# Patient Record
Sex: MALE | Race: WHITE | NOT HISPANIC OR LATINO | Employment: OTHER | URBAN - METROPOLITAN AREA
[De-identification: names, ages, dates, MRNs, and addresses within clinical notes are randomized per-mention and may not be internally consistent; named-entity substitution may affect disease eponyms.]

---

## 2017-09-09 ENCOUNTER — APPOINTMENT (OUTPATIENT)
Dept: GENERAL RADIOLOGY | Facility: HOSPITAL | Age: 82
End: 2017-09-09

## 2017-09-09 ENCOUNTER — HOSPITAL ENCOUNTER (INPATIENT)
Facility: HOSPITAL | Age: 82
LOS: 5 days | End: 2017-09-14
Attending: EMERGENCY MEDICINE | Admitting: HOSPITALIST

## 2017-09-09 ENCOUNTER — APPOINTMENT (OUTPATIENT)
Dept: CT IMAGING | Facility: HOSPITAL | Age: 82
End: 2017-09-09

## 2017-09-09 DIAGNOSIS — T83.511A URINARY TRACT INFECTION ASSOCIATED WITH INDWELLING URETHRAL CATHETER, INITIAL ENCOUNTER (HCC): Primary | ICD-10-CM

## 2017-09-09 DIAGNOSIS — E87.5 HYPERKALEMIA: ICD-10-CM

## 2017-09-09 DIAGNOSIS — N17.9 ACUTE KIDNEY INJURY (HCC): ICD-10-CM

## 2017-09-09 DIAGNOSIS — F29 PSYCHOSIS, UNSPECIFIED PSYCHOSIS TYPE (HCC): ICD-10-CM

## 2017-09-09 DIAGNOSIS — N39.0 URINARY TRACT INFECTION ASSOCIATED WITH INDWELLING URETHRAL CATHETER, INITIAL ENCOUNTER (HCC): Primary | ICD-10-CM

## 2017-09-09 DIAGNOSIS — R53.1 GENERALIZED WEAKNESS: ICD-10-CM

## 2017-09-09 PROBLEM — J44.9 COPD (CHRONIC OBSTRUCTIVE PULMONARY DISEASE) (HCC): Status: ACTIVE | Noted: 2017-09-09

## 2017-09-09 PROBLEM — F03.90 DEMENTIA (HCC): Status: ACTIVE | Noted: 2017-09-09

## 2017-09-09 PROBLEM — R33.9 URINARY RETENTION: Status: ACTIVE | Noted: 2017-09-09

## 2017-09-09 PROBLEM — E07.9 DISEASE OF THYROID GLAND: Status: ACTIVE | Noted: 2017-09-09

## 2017-09-09 PROBLEM — J96.10 CHRONIC RESPIRATORY FAILURE (HCC): Status: ACTIVE | Noted: 2017-09-09

## 2017-09-09 PROBLEM — E11.9 DM II (DIABETES MELLITUS, TYPE II), CONTROLLED (HCC): Status: ACTIVE | Noted: 2017-09-09

## 2017-09-09 PROBLEM — E87.1 HYPONATREMIA: Status: ACTIVE | Noted: 2017-09-09

## 2017-09-09 PROBLEM — E87.6 HYPOKALEMIA: Status: ACTIVE | Noted: 2017-09-09

## 2017-09-09 LAB
ALBUMIN SERPL-MCNC: 3.4 G/DL (ref 3.5–5.2)
ALBUMIN/GLOB SERPL: 1 G/DL
ALP SERPL-CCNC: 51 U/L (ref 39–117)
ALT SERPL W P-5'-P-CCNC: 15 U/L (ref 1–41)
AMPHET+METHAMPHET UR QL: NEGATIVE
ANION GAP SERPL CALCULATED.3IONS-SCNC: 9.6 MMOL/L
AST SERPL-CCNC: 15 U/L (ref 1–40)
BACTERIA UR QL AUTO: ABNORMAL /HPF
BARBITURATES UR QL SCN: NEGATIVE
BASOPHILS # BLD AUTO: 0.01 10*3/MM3 (ref 0–0.2)
BASOPHILS NFR BLD AUTO: 0.1 % (ref 0–1.5)
BENZODIAZ UR QL SCN: NEGATIVE
BILIRUB SERPL-MCNC: 0.2 MG/DL (ref 0.1–1.2)
BILIRUB UR QL STRIP: NEGATIVE
BUN BLD-MCNC: 27 MG/DL (ref 8–23)
BUN/CREAT SERPL: 17.1 (ref 7–25)
CALCIUM SPEC-SCNC: 9 MG/DL (ref 8.6–10.5)
CANNABINOIDS SERPL QL: NEGATIVE
CHLORIDE SERPL-SCNC: 100 MMOL/L (ref 98–107)
CLARITY UR: ABNORMAL
CO2 SERPL-SCNC: 25.4 MMOL/L (ref 22–29)
COCAINE UR QL: NEGATIVE
COLOR UR: ABNORMAL
CREAT BLD-MCNC: 1.58 MG/DL (ref 0.76–1.27)
DEPRECATED RDW RBC AUTO: 42.6 FL (ref 37–54)
EOSINOPHIL # BLD AUTO: 0.03 10*3/MM3 (ref 0–0.7)
EOSINOPHIL NFR BLD AUTO: 0.4 % (ref 0.3–6.2)
ERYTHROCYTE [DISTWIDTH] IN BLOOD BY AUTOMATED COUNT: 13.1 % (ref 11.5–14.5)
GFR SERPL CREATININE-BSD FRML MDRD: 42 ML/MIN/1.73
GLOBULIN UR ELPH-MCNC: 3.5 GM/DL
GLUCOSE BLD-MCNC: 109 MG/DL (ref 65–99)
GLUCOSE BLDC GLUCOMTR-MCNC: 103 MG/DL (ref 70–130)
GLUCOSE BLDC GLUCOMTR-MCNC: 139 MG/DL (ref 70–130)
GLUCOSE UR STRIP-MCNC: NEGATIVE MG/DL
HCT VFR BLD AUTO: 35.8 % (ref 40.4–52.2)
HGB BLD-MCNC: 11.9 G/DL (ref 13.7–17.6)
HGB UR QL STRIP.AUTO: NEGATIVE
HYALINE CASTS UR QL AUTO: ABNORMAL /LPF
IMM GRANULOCYTES # BLD: 0.05 10*3/MM3 (ref 0–0.03)
IMM GRANULOCYTES NFR BLD: 0.6 % (ref 0–0.5)
KETONES UR QL STRIP: NEGATIVE
LEUKOCYTE ESTERASE UR QL STRIP.AUTO: ABNORMAL
LYMPHOCYTES # BLD AUTO: 1 10*3/MM3 (ref 0.9–4.8)
LYMPHOCYTES NFR BLD AUTO: 12 % (ref 19.6–45.3)
MCH RBC QN AUTO: 29.6 PG (ref 27–32.7)
MCHC RBC AUTO-ENTMCNC: 33.2 G/DL (ref 32.6–36.4)
MCV RBC AUTO: 89.1 FL (ref 79.8–96.2)
METHADONE UR QL SCN: NEGATIVE
MONOCYTES # BLD AUTO: 0.56 10*3/MM3 (ref 0.2–1.2)
MONOCYTES NFR BLD AUTO: 6.7 % (ref 5–12)
NEUTROPHILS # BLD AUTO: 6.67 10*3/MM3 (ref 1.9–8.1)
NEUTROPHILS NFR BLD AUTO: 80.2 % (ref 42.7–76)
NITRITE UR QL STRIP: POSITIVE
NT-PROBNP SERPL-MCNC: 297.3 PG/ML (ref 0–1800)
OPIATES UR QL: NEGATIVE
OXYCODONE UR QL SCN: NEGATIVE
PH UR STRIP.AUTO: 5.5 [PH] (ref 5–8)
PLATELET # BLD AUTO: 81 10*3/MM3 (ref 140–500)
PMV BLD AUTO: 9.9 FL (ref 6–12)
POTASSIUM BLD-SCNC: 5.8 MMOL/L (ref 3.5–5.2)
PROT SERPL-MCNC: 6.9 G/DL (ref 6–8.5)
PROT UR QL STRIP: NEGATIVE
RBC # BLD AUTO: 4.02 10*6/MM3 (ref 4.6–6)
RBC # UR: ABNORMAL /HPF
REF LAB TEST METHOD: ABNORMAL
SODIUM BLD-SCNC: 135 MMOL/L (ref 136–145)
SP GR UR STRIP: 1.01 (ref 1–1.03)
SQUAMOUS #/AREA URNS HPF: ABNORMAL /HPF
TROPONIN T SERPL-MCNC: <0.01 NG/ML (ref 0–0.03)
UROBILINOGEN UR QL STRIP: ABNORMAL
WBC NRBC COR # BLD: 8.32 10*3/MM3 (ref 4.5–10.7)
WBC UR QL AUTO: ABNORMAL /HPF

## 2017-09-09 PROCEDURE — 71020 HC CHEST PA AND LATERAL: CPT

## 2017-09-09 PROCEDURE — 25010000002 CEFTRIAXONE PER 250 MG: Performed by: EMERGENCY MEDICINE

## 2017-09-09 PROCEDURE — 93005 ELECTROCARDIOGRAM TRACING: CPT | Performed by: EMERGENCY MEDICINE

## 2017-09-09 PROCEDURE — 80307 DRUG TEST PRSMV CHEM ANLYZR: CPT | Performed by: HOSPITALIST

## 2017-09-09 PROCEDURE — 51702 INSERT TEMP BLADDER CATH: CPT

## 2017-09-09 PROCEDURE — 70450 CT HEAD/BRAIN W/O DYE: CPT

## 2017-09-09 PROCEDURE — 94640 AIRWAY INHALATION TREATMENT: CPT

## 2017-09-09 PROCEDURE — 83880 ASSAY OF NATRIURETIC PEPTIDE: CPT | Performed by: EMERGENCY MEDICINE

## 2017-09-09 PROCEDURE — 93010 ELECTROCARDIOGRAM REPORT: CPT | Performed by: INTERNAL MEDICINE

## 2017-09-09 PROCEDURE — 81001 URINALYSIS AUTO W/SCOPE: CPT | Performed by: EMERGENCY MEDICINE

## 2017-09-09 PROCEDURE — 82962 GLUCOSE BLOOD TEST: CPT

## 2017-09-09 PROCEDURE — 25010000002 VANCOMYCIN: Performed by: HOSPITALIST

## 2017-09-09 PROCEDURE — 85025 COMPLETE CBC W/AUTO DIFF WBC: CPT | Performed by: EMERGENCY MEDICINE

## 2017-09-09 PROCEDURE — 25010000002 ENOXAPARIN PER 10 MG: Performed by: HOSPITALIST

## 2017-09-09 PROCEDURE — 84484 ASSAY OF TROPONIN QUANT: CPT | Performed by: EMERGENCY MEDICINE

## 2017-09-09 PROCEDURE — 87081 CULTURE SCREEN ONLY: CPT | Performed by: HOSPITALIST

## 2017-09-09 PROCEDURE — 99285 EMERGENCY DEPT VISIT HI MDM: CPT

## 2017-09-09 PROCEDURE — 94799 UNLISTED PULMONARY SVC/PX: CPT

## 2017-09-09 PROCEDURE — 87086 URINE CULTURE/COLONY COUNT: CPT | Performed by: EMERGENCY MEDICINE

## 2017-09-09 PROCEDURE — 80053 COMPREHEN METABOLIC PANEL: CPT | Performed by: EMERGENCY MEDICINE

## 2017-09-09 RX ORDER — LORAZEPAM 0.5 MG/1
0.5 TABLET ORAL DAILY
COMMUNITY
End: 2017-09-14 | Stop reason: HOSPADM

## 2017-09-09 RX ORDER — SODIUM CHLORIDE 0.9 % (FLUSH) 0.9 %
1-10 SYRINGE (ML) INJECTION AS NEEDED
Status: DISCONTINUED | OUTPATIENT
Start: 2017-09-09 | End: 2017-09-14 | Stop reason: HOSPADM

## 2017-09-09 RX ORDER — PANTOPRAZOLE SODIUM 40 MG/1
40 TABLET, DELAYED RELEASE ORAL DAILY
COMMUNITY

## 2017-09-09 RX ORDER — DEXTROSE MONOHYDRATE 25 G/50ML
25 INJECTION, SOLUTION INTRAVENOUS
Status: DISCONTINUED | OUTPATIENT
Start: 2017-09-09 | End: 2017-09-14 | Stop reason: HOSPADM

## 2017-09-09 RX ORDER — NICOTINE POLACRILEX 4 MG
15 LOZENGE BUCCAL
Status: DISCONTINUED | OUTPATIENT
Start: 2017-09-09 | End: 2017-09-14 | Stop reason: HOSPADM

## 2017-09-09 RX ORDER — HYDROXYZINE HYDROCHLORIDE 25 MG/1
25 TABLET, FILM COATED ORAL 2 TIMES DAILY
Status: DISCONTINUED | OUTPATIENT
Start: 2017-09-09 | End: 2017-09-14 | Stop reason: HOSPADM

## 2017-09-09 RX ORDER — LEVOTHYROXINE SODIUM 0.03 MG/1
25 TABLET ORAL DAILY
COMMUNITY

## 2017-09-09 RX ORDER — HYDROXYZINE HYDROCHLORIDE 25 MG/1
25 TABLET, FILM COATED ORAL 2 TIMES DAILY
COMMUNITY

## 2017-09-09 RX ORDER — ONDANSETRON 4 MG/1
4 TABLET, FILM COATED ORAL EVERY 6 HOURS PRN
Status: DISCONTINUED | OUTPATIENT
Start: 2017-09-09 | End: 2017-09-14 | Stop reason: HOSPADM

## 2017-09-09 RX ORDER — BUDESONIDE AND FORMOTEROL FUMARATE DIHYDRATE 80; 4.5 UG/1; UG/1
2 AEROSOL RESPIRATORY (INHALATION)
Status: DISCONTINUED | OUTPATIENT
Start: 2017-09-09 | End: 2017-09-14 | Stop reason: HOSPADM

## 2017-09-09 RX ORDER — MEMANTINE HYDROCHLORIDE 10 MG/1
10 TABLET ORAL DAILY
COMMUNITY
End: 2017-09-21 | Stop reason: HOSPADM

## 2017-09-09 RX ORDER — HALOPERIDOL 1 MG/1
1 TABLET ORAL 2 TIMES DAILY
COMMUNITY
End: 2017-09-14 | Stop reason: HOSPADM

## 2017-09-09 RX ORDER — GLIPIZIDE 5 MG/1
5 TABLET ORAL DAILY
COMMUNITY
End: 2017-09-14 | Stop reason: HOSPADM

## 2017-09-09 RX ORDER — TAMSULOSIN HYDROCHLORIDE 0.4 MG/1
1 CAPSULE ORAL NIGHTLY
COMMUNITY

## 2017-09-09 RX ORDER — LEVOTHYROXINE SODIUM 0.03 MG/1
25 TABLET ORAL DAILY
Status: DISCONTINUED | OUTPATIENT
Start: 2017-09-09 | End: 2017-09-14 | Stop reason: HOSPADM

## 2017-09-09 RX ORDER — TAMSULOSIN HYDROCHLORIDE 0.4 MG/1
0.4 CAPSULE ORAL NIGHTLY
Status: DISCONTINUED | OUTPATIENT
Start: 2017-09-09 | End: 2017-09-14 | Stop reason: HOSPADM

## 2017-09-09 RX ORDER — MEMANTINE HYDROCHLORIDE 10 MG/1
10 TABLET ORAL DAILY
Status: DISCONTINUED | OUTPATIENT
Start: 2017-09-09 | End: 2017-09-14 | Stop reason: HOSPADM

## 2017-09-09 RX ORDER — GLIPIZIDE 5 MG/1
5 TABLET ORAL DAILY
Status: DISCONTINUED | OUTPATIENT
Start: 2017-09-09 | End: 2017-09-10

## 2017-09-09 RX ORDER — ONDANSETRON 2 MG/ML
4 INJECTION INTRAMUSCULAR; INTRAVENOUS EVERY 6 HOURS PRN
Status: DISCONTINUED | OUTPATIENT
Start: 2017-09-09 | End: 2017-09-14 | Stop reason: HOSPADM

## 2017-09-09 RX ORDER — ASPIRIN 325 MG
325 TABLET ORAL DAILY
COMMUNITY

## 2017-09-09 RX ORDER — RISPERIDONE 1 MG/1
1 TABLET ORAL 2 TIMES DAILY
Status: DISCONTINUED | OUTPATIENT
Start: 2017-09-09 | End: 2017-09-14 | Stop reason: HOSPADM

## 2017-09-09 RX ORDER — ACETAMINOPHEN 325 MG/1
650 TABLET ORAL EVERY 4 HOURS PRN
Status: DISCONTINUED | OUTPATIENT
Start: 2017-09-09 | End: 2017-09-14 | Stop reason: HOSPADM

## 2017-09-09 RX ORDER — MONTELUKAST SODIUM 10 MG/1
10 TABLET ORAL NIGHTLY
COMMUNITY

## 2017-09-09 RX ORDER — PANTOPRAZOLE SODIUM 40 MG/1
40 TABLET, DELAYED RELEASE ORAL DAILY
Status: DISCONTINUED | OUTPATIENT
Start: 2017-09-09 | End: 2017-09-14 | Stop reason: HOSPADM

## 2017-09-09 RX ORDER — SODIUM CHLORIDE 9 MG/ML
50 INJECTION, SOLUTION INTRAVENOUS CONTINUOUS
Status: DISCONTINUED | OUTPATIENT
Start: 2017-09-09 | End: 2017-09-11

## 2017-09-09 RX ORDER — OMEGA-3S/DHA/EPA/FISH OIL/D3 300MG-1000
400 CAPSULE ORAL DAILY
Status: DISCONTINUED | OUTPATIENT
Start: 2017-09-09 | End: 2017-09-14 | Stop reason: HOSPADM

## 2017-09-09 RX ORDER — RISPERIDONE 1 MG/1
1 TABLET ORAL 2 TIMES DAILY
COMMUNITY
End: 2017-09-21 | Stop reason: HOSPADM

## 2017-09-09 RX ORDER — SODIUM CHLORIDE 0.9 % (FLUSH) 0.9 %
10 SYRINGE (ML) INJECTION AS NEEDED
Status: DISCONTINUED | OUTPATIENT
Start: 2017-09-09 | End: 2017-09-14 | Stop reason: HOSPADM

## 2017-09-09 RX ORDER — ERGOCALCIFEROL (VITAMIN D2) 10 MCG
400 TABLET ORAL DAILY
COMMUNITY

## 2017-09-09 RX ORDER — ONDANSETRON 4 MG/1
4 TABLET, ORALLY DISINTEGRATING ORAL EVERY 6 HOURS PRN
Status: DISCONTINUED | OUTPATIENT
Start: 2017-09-09 | End: 2017-09-14 | Stop reason: HOSPADM

## 2017-09-09 RX ORDER — ASPIRIN 325 MG
325 TABLET ORAL DAILY
Status: DISCONTINUED | OUTPATIENT
Start: 2017-09-09 | End: 2017-09-14 | Stop reason: HOSPADM

## 2017-09-09 RX ORDER — MONTELUKAST SODIUM 10 MG/1
10 TABLET ORAL NIGHTLY
Status: DISCONTINUED | OUTPATIENT
Start: 2017-09-09 | End: 2017-09-14 | Stop reason: HOSPADM

## 2017-09-09 RX ORDER — CEFTRIAXONE SODIUM 1 G/50ML
1 INJECTION, SOLUTION INTRAVENOUS ONCE
Status: COMPLETED | OUTPATIENT
Start: 2017-09-09 | End: 2017-09-09

## 2017-09-09 RX ADMIN — ASPIRIN 325 MG: 325 TABLET ORAL at 18:31

## 2017-09-09 RX ADMIN — MEMANTINE HYDROCHLORIDE 10 MG: 10 TABLET, FILM COATED ORAL at 18:31

## 2017-09-09 RX ADMIN — HYDROXYZINE HYDROCHLORIDE 25 MG: 25 TABLET, FILM COATED ORAL at 18:31

## 2017-09-09 RX ADMIN — GLIPIZIDE 5 MG: 5 TABLET ORAL at 18:32

## 2017-09-09 RX ADMIN — ENOXAPARIN SODIUM 30 MG: 30 INJECTION SUBCUTANEOUS at 18:32

## 2017-09-09 RX ADMIN — BUDESONIDE AND FORMOTEROL FUMARATE DIHYDRATE 2 PUFF: 80; 4.5 AEROSOL RESPIRATORY (INHALATION) at 19:42

## 2017-09-09 RX ADMIN — SODIUM CHLORIDE 100 ML/HR: 9 INJECTION, SOLUTION INTRAVENOUS at 18:00

## 2017-09-09 RX ADMIN — SERTRALINE 50 MG: 50 TABLET, FILM COATED ORAL at 18:31

## 2017-09-09 RX ADMIN — LEVOTHYROXINE SODIUM 25 MCG: 25 TABLET ORAL at 18:31

## 2017-09-09 RX ADMIN — SODIUM CHLORIDE 1000 ML: 9 INJECTION, SOLUTION INTRAVENOUS at 10:08

## 2017-09-09 RX ADMIN — CEFTRIAXONE SODIUM 1 G: 1 INJECTION, SOLUTION INTRAVENOUS at 10:09

## 2017-09-09 RX ADMIN — CHOLECALCIFEROL TAB 10 MCG (400 UNIT) 400 UNITS: 10 TAB at 18:32

## 2017-09-09 RX ADMIN — PANTOPRAZOLE SODIUM 40 MG: 40 TABLET, DELAYED RELEASE ORAL at 18:31

## 2017-09-09 RX ADMIN — RISPERIDONE 1 MG: 1 TABLET, FILM COATED ORAL at 18:31

## 2017-09-09 RX ADMIN — MONTELUKAST 10 MG: 10 TABLET, FILM COATED ORAL at 20:07

## 2017-09-09 RX ADMIN — TAMSULOSIN HYDROCHLORIDE 0.4 MG: 0.4 CAPSULE ORAL at 20:07

## 2017-09-09 RX ADMIN — VANCOMYCIN HYDROCHLORIDE 1500 MG: 1 INJECTION, POWDER, LYOPHILIZED, FOR SOLUTION INTRAVENOUS at 20:07

## 2017-09-09 NOTE — PROGRESS NOTES
"Pharmacokinetic Consult - Vancomycin Dosing (Initial Note)    Juilano Garzon is a 87 y.o. male 67\" (170.2 cm) 152 lb 14.4 oz (69.4 kg)   Pharmacy consulted to dose vancomycin for UTI.  Pharmacy dosing vancomycin per Alla's request.   Goal trough: 10 - 20 mcg/mL   Additional Abx Therapy:    IV Anti-Infectives     Ordered     Dose/Rate Route Frequency Start Stop    09/09/17 1732  Pharmacy to dose vancomycin     Ordering Provider:  Reinier Gold MD     Does not apply Continuous PRN 09/09/17 1725      09/09/17 0943  cefTRIAXone (ROCEPHIN) IVPB 1 g     Ordering Provider:  Chandana Khan MD    1 g  over 30 Minutes Intravenous Once 09/09/17 0945 09/09/17 1039           Relevant clinical data and objective history reviewed:  -MRSA UTI 2 weeks ago treated at [facility]; presents to [facility] with AMS       Lab Results   Component Value Date    CREATININE 1.58 (H) 09/09/2017     Estimated Creatinine Clearance: 32.3 mL/min (by C-G formula based on Cr of 1.58).    Lab Results   Component Value Date    WBC 8.32 09/09/2017     Temp Readings from Last 1 Encounters:   09/09/17 98.1 °F (36.7 °C) (Oral)       Baseline culture/source/susceptibility:   9/9 MRSA Screen: Pending  9/9 Urine Cx: Pending      Assessment/Plan  Will start vancomycin 1500 mg IV x1 dose and intermittently dose due to the patient's poor renal function (RENZO), no available patient PMH of vancomycin dosing, and lower trough goal of 10 - 20. Vancomycin level due on 9/9 @ 0600 with AM labs.  Will monitor serum creatinine and vancomycin levels and adjust as needed. Recommend adequate hydration to prevent nephrotoxicity. Thank you Dr Gold for this consult.     Paulino Nava, PharmD  09/09/17 5:52 PM    "

## 2017-09-09 NOTE — ED TRIAGE NOTES
EMS called to truck stop by family.  Neighbor had found wandering in woods with blood in catheter unattached to bag.  Family pulled an old bag out of trash and attached to catheter.     Pt is from [place].  Family was bringing pt from [place] to [facility] but pulled into truck stop and called EMS.  Family stated they driving back home to get DNR and living will.

## 2017-09-09 NOTE — PLAN OF CARE
Problem: Patient Care Overview (Adult)  Goal: Plan of Care Review  Outcome: Ongoing (interventions implemented as appropriate)    09/09/17 1237   Coping/Psychosocial Response Interventions   Plan Of Care Reviewed With patient;daughter   Patient Care Overview   Progress no change   Outcome Evaluation   Outcome Summary/Follow up Plan Pt. admitted with confusion,psychosis and UTI. Pt. has moses catheter for urinery retention. Pt. with history of falls and scabs on lower ext. from falls. Pt. requires reorientation and frequent reminders . Family is concerned with pt. living alone and would prefer placement for rehab. Consults obtained. Will continue to monitor. 09/09/17         Problem: Fall Risk (Adult)  Goal: Identify Related Risk Factors and Signs and Symptoms  Outcome: Ongoing (interventions implemented as appropriate)  Goal: Absence of Falls  Outcome: Ongoing (interventions implemented as appropriate)    Problem: Infection, Risk/Actual (Adult)  Goal: Identify Related Risk Factors and Signs and Symptoms  Outcome: Ongoing (interventions implemented as appropriate)  Goal: Infection Prevention/Resolution  Outcome: Ongoing (interventions implemented as appropriate)    Problem: Confusion, Acute (Adult)  Goal: Identify Related Risk Factors and Signs and Symptoms  Outcome: Ongoing (interventions implemented as appropriate)  Goal: Cognitive/Functional Impairments Minimized  Outcome: Ongoing (interventions implemented as appropriate)  Goal: Safety  Outcome: Ongoing (interventions implemented as appropriate)

## 2017-09-09 NOTE — H&P
"HISTORY AND PHYSICAL   [facility]        Patient Identification:  Name: Juliano Garzon  Age: 87 y.o.  Sex: male  :  1929  MRN: [id number]                     Primary Care Physician: No Known Provider    Chief Complaint:  Confusion    History of Present Illness:   Patient is a poor historian and the history comes from discussion with the ER staff, available records and his son who is presently at bedside.  DR history of present illness action summarizes the acute issues specifically as follows:    \"Pt is a 87 y.o. male who presents complaining of confusion. [facility] for [facility] MRSA kidney infection. After pt was discharged, he stayed with his daughter for 2 days, before returning to his own home for the rest of the week. Last night, the pt was found wondering around his neighborhood around 0400. The pt had cut his catheter, so that it was no longer connected to its bag. The pt was found after he rang his neighbors doorbell claiming that he was looking for his daughter. Pt was not wearing his O2 while walking and his O2 stats were in the 70s when he was found. Per daughter, the pt has been having visual hallucinations for the last 3 years, but otherwise functions normally. Pt c/o back pain which has since resolved. Pt denies cp, SOA, nausea, and vomiting. Pt was put on vancomycin and bactrim for his kidney infection. Per EMS the pt's blood sugar levels were 129.\"  His son at bedside notes there is been issues with dementia for some time now.  He notes that for almost a year now the patient has been seeing people that are not there.  They have been urging him not to live alone and quite adamant that he lives in his own house by himself.  On questioning he notes that he's had a Gonsalez catheter in place for 6 months now.  Apparently he is also on home O2 is uncertain.  I'm presently also waiting for records from [place] in " [place]          Past Medical History:  Past Medical History:   Diagnosis Date   • COPD (chronic obstructive pulmonary disease)    • Dementia    • Diabetes mellitus    • Disease of thyroid gland    • GERD (gastroesophageal reflux disease)      Past Surgical History:  History reviewed. No pertinent surgical history.   Home Meds:  Prescriptions Prior to Admission   Medication Sig Dispense Refill Last Dose   • aspirin 325 MG tablet Take 325 mg by mouth Daily.      • Fluticasone Furoate-Vilanterol (BREO ELLIPTA) 100-25 MCG/INH aerosol powder  Inhale Daily.      • glipiZIDE (GLUCOTROL) 5 MG tablet Take 5 mg by mouth Daily.      • haloperidol (HALDOL) 1 MG tablet Take 1 mg by mouth 2 (Two) Times a Day.      • hydrOXYzine (ATARAX) 25 MG tablet Take 25 mg by mouth 2 (Two) Times a Day.      • levothyroxine (SYNTHROID, LEVOTHROID) 25 MCG tablet Take 25 mcg by mouth Daily.      • LORazepam (ATIVAN) 0.5 MG tablet Take 0.5 mg by mouth Daily.      • memantine (NAMENDA) 10 MG tablet Take 10 mg by mouth Daily.      • metFORMIN (GLUCOPHAGE) 500 MG tablet Take 500 mg by mouth 2 (Two) Times a Day With Meals.      • montelukast (SINGULAIR) 10 MG tablet Take 10 mg by mouth Every Night.      • pantoprazole (PROTONIX) 40 MG EC tablet Take 40 mg by mouth Daily.      • risperiDONE (risperDAL) 1 MG tablet Take 1 mg by mouth 2 (Two) Times a Day.      • sertraline (ZOLOFT) 50 MG tablet Take 50 mg by mouth Daily.      • tamsulosin (FLOMAX) 0.4 MG capsule 24 hr capsule Take 1 capsule by mouth Every Night.      • Vitamin D, Cholecalciferol, (CHOLECALCIFEROL) 400 units tablet Take 400 Units by mouth Daily.          Allergies:  No Known Allergies  Immunizations:    There is no immunization history on file for this patient.  Social History:   Social History     Social History Narrative   • No narrative on file     Social History   Substance Use Topics   • Smoking status: Former Smoker     Types: Cigarettes   • Smokeless tobacco: Not on file   •  Alcohol use No     Family History:  History reviewed. No pertinent family history.     Review of Systems  Review of Systems   Unable to perform ROS: Dementia       Objective:  tMax 24 hrs: Temp (24hrs), Av.9 °F (36.6 °C), Min:97.6 °F (36.4 °C), Max:98.3 °F (36.8 °C)    Vitals Ranges:   Temp:  [97.6 °F (36.4 °C)-98.3 °F (36.8 °C)] 98.1 °F (36.7 °C)  Heart Rate:  [63-79] 66  Resp:  [15-18] 16  BP: (127-154)/(54-92) 127/64      Exam:  Physical Exam   Constitutional: He appears well-developed and well-nourished. No distress.   Moderately thin.   HENT:   Head: Normocephalic and atraumatic.   Right Ear: External ear normal.   Left Ear: External ear normal.   Nose: Nose normal.   Mouth/Throat: Oropharynx is clear and moist.   Eyes: Conjunctivae and EOM are normal. Left eye exhibits no discharge. No scleral icterus.   Neck: Neck supple. No tracheal deviation present. No thyromegaly present.   Cardiovascular: Normal rate and regular rhythm.  Exam reveals no gallop and no friction rub.    Murmur (2/6 systolic) heard.  Pedal pulses right lower extremity slight.  Left lower extremity 1+.  Upper extremity shy 2+ bilaterally.   Pulmonary/Chest: Effort normal and breath sounds normal. No stridor. No respiratory distress. He exhibits no tenderness.   Abdominal: Soft. Bowel sounds are normal. He exhibits no distension and no mass. There is no tenderness. There is no rebound and no guarding.   Musculoskeletal: He exhibits no edema.   Neurological: He is alert.   Oriented to person.  After some time he did figure out that he was in a hospital.  Conversant and moderately cooperative.  His answers to questions are frequently not related to the question that was asked.   Skin: Skin is warm and dry. He is not diaphoretic.   Psychiatric:   Please see neurologic exam.       Data Review:  All labs and radiology reviewed.    Assessment:  Principal Problem:    RENZO (acute kidney injury):  Presently creatinine reportedly significantly above  his baseline.  Awaiting labs from [facility].  We'll hydrate the patient overnight and monitor closely.    Active Problems:      Urinary tract infection associated with indwelling urethral catheter:  Recent history of MRSA UTI.  We'll go ahead and cover with vancomycin pending culture results.  I will also recheck a urinalysis as I'm not entirely sure of the origin of the first sample.      Psychosis:  We'll start on routine Seroquel.  I have requested family member stay with patient for the night if possible.      Dementia:  Probably Alzheimer's in origin.  We'll check RPR, B12 etc. and get a neurology opinion.      Disease of thyroid gland      COPD (chronic obstructive pulmonary disease)      Chronic respiratory failure      Hypokalemia:  In combination with the hyponatremia I will check a morning cortisol level.      Hyponatremia:       Urinary retention:   We'll add Flomax to his regime and get a urology opinion      DM II (diabetes mellitus, type II), controlled:  Provide sliding-scale insulin.    Pt has document on record stating DNR.       Plan:  Please see above.    Reinier Gold MD  9/9/2017  5:01 PM    EMR Dragon/Transcription disclaimer:   Much of this encounter note is an electronic transcription/translation of spoken language to printed text. The electronic translation of spoken language may permit erroneous, or at times, nonsensical words or phrases to be inadvertently transcribed; Although I have reviewed the note for such errors, some may still exist.

## 2017-09-09 NOTE — ED PROVIDER NOTES
EMERGENCY DEPARTMENT ENCOUNTER    CHIEF COMPLAINT  Chief Complaint: confusion  History given by: pt's daughter and son  History limited by: nothing  Room Number: 16/16  PMD: No Known Provider  Wally Valdez    HPI:  Pt is a 87 y.o. male who presents complaining of confusion. Pt was admitted to [facility] for a week, two weeks ago for a MRSA kidney infection. After pt was discharged, he stayed with his daughter for 2 days, before returning to his own home for the rest of the week. Last night, the pt was found wondering around his neighborhood around 0400. The pt had cut his catheter, so that it was no longer connected to its bag. The pt was found after he rang his neighbors doorbell claiming that he was looking for his daughter. Pt was not wearing his O2 while walking and his O2 stats were in the 70s when he was found. Per daughter, the pt has been having visual hallucinations for the last 3 years, but otherwise functions normally. Pt c/o back pain which has since resolved. Pt denies cp, SOA, nausea, and vomiting. Pt was put on vancomycin and bactrim for his kidney infection. Per EMS the pt's blood sugar levels were 129.    Duration:  Since earlier this morning  Onset: gradual  Timing: constant  Radiation: none  Quality: confusion  Intensity/Severity: moderate  Progression: resolved  Associated Symptoms: back pain  Aggravating Factors: none  Alleviating Factors: none  Previous Episodes: Pt has had visual hallucinations for the past 3 years.  Treatment before arrival: Pt was put on vancomycin and bactrim for his kidney infection.    PAST MEDICAL HISTORY  Active Ambulatory Problems     Diagnosis Date Noted   • No Active Ambulatory Problems     Resolved Ambulatory Problems     Diagnosis Date Noted   • No Resolved Ambulatory Problems     Past Medical History:   Diagnosis Date   • COPD (chronic obstructive pulmonary disease)    • Dementia    • Diabetes mellitus    • Disease of thyroid gland    • GERD  (gastroesophageal reflux disease)        PAST SURGICAL HISTORY  History reviewed. No pertinent surgical history.    FAMILY HISTORY  History reviewed. No pertinent family history.    SOCIAL HISTORY  Social History     Social History   • Marital status: Single     Spouse name: N/A   • Number of children: N/A   • Years of education: N/A     Occupational History   • Not on file.     Social History Main Topics   • Smoking status: Former Smoker     Types: Cigarettes   • Smokeless tobacco: Not on file   • Alcohol use No   • Drug use: No   • Sexual activity: Not on file     Other Topics Concern   • Not on file     Social History Narrative   • No narrative on file       ALLERGIES  Review of patient's allergies indicates no known allergies.    REVIEW OF SYSTEMS  Review of Systems   Constitutional: Negative for activity change, appetite change and fever.   HENT: Negative for congestion and sore throat.    Eyes: Negative.    Respiratory: Negative for cough and shortness of breath.    Cardiovascular: Negative for chest pain and leg swelling.   Gastrointestinal: Negative for abdominal pain, diarrhea and vomiting.   Endocrine: Negative.    Genitourinary: Negative for decreased urine volume and dysuria.   Musculoskeletal: Positive for back pain (since resolved). Negative for neck pain.   Skin: Negative for rash and wound.   Allergic/Immunologic: Negative.    Neurological: Negative for weakness, numbness and headaches.   Hematological: Negative.    Psychiatric/Behavioral: Positive for confusion.   All other systems reviewed and are negative.      PHYSICAL EXAM  ED Triage Vitals   Temp Heart Rate Resp BP SpO2   09/09/17 0642 09/09/17 0652 09/09/17 0652 09/09/17 0652 09/09/17 0652   97.6 °F (36.4 °C) 79 18 136/87 100 %      Temp src Heart Rate Source Patient Position BP Location FiO2 (%)   09/09/17 0642 09/09/17 0652 -- -- --   Tympanic Monitor          Physical Exam   Constitutional: He is well-developed, well-nourished, and in no  distress.   HENT:   Head: Normocephalic and atraumatic.   Eyes: EOM are normal. Pupils are equal, round, and reactive to light.   Neck: Normal range of motion. Neck supple.   Cardiovascular: Normal rate, regular rhythm and normal heart sounds.    Pulmonary/Chest: Effort normal and breath sounds normal. No respiratory distress.   Abdominal: Soft. There is no tenderness. There is no rebound and no guarding.   Genitourinary:   Genitourinary Comments: Gonsalez catheter in place, draining dark urine   Musculoskeletal: Normal range of motion. He exhibits no edema.   No horacio tenderness   Neurological: He is alert. He has normal sensation and normal strength.   Oriented to person and place  No aphasia   No dysarthria   Skin: Skin is warm and dry.   Superficial abrasions to R knee, R shin, and left knee   Psychiatric: Mood and affect normal.   Intermittent visual hallucinations    Nursing note and vitals reviewed.      LAB RESULTS  Lab Results (last 24 hours)     Procedure Component Value Units Date/Time    CBC & Differential [[id number]] Collected:  09/09/17 0800    Specimen:  Blood Updated:  09/09/17 0821    Narrative:       The following orders were created for panel order CBC & Differential.  Procedure                               Abnormality         Status                     ---------                               -----------         ------                     CBC Auto Differential[[id number]]        Abnormal            Final result                 Please view results for these tests on the individual orders.    Comprehensive Metabolic Panel [[id number]]  (Abnormal) Collected:  09/09/17 0800    Specimen:  Blood Updated:  09/09/17 0838     Glucose 109 (H) mg/dL      BUN 27 (H) mg/dL      Creatinine 1.58 (H) mg/dL      Sodium 135 (L) mmol/L      Potassium 5.8 (H) mmol/L      Chloride 100 mmol/L      CO2 25.4 mmol/L      Calcium 9.0 mg/dL      Total Protein 6.9 g/dL      Albumin 3.40 (L) g/dL      ALT (SGPT) 15 U/L      AST  (SGOT) 15 U/L      Alkaline Phosphatase 51 U/L      Total Bilirubin 0.2 mg/dL      eGFR Non African Amer 42 (L) mL/min/1.73      Globulin 3.5 gm/dL      A/G Ratio 1.0 g/dL      BUN/Creatinine Ratio 17.1     Anion Gap 9.6 mmol/L     Narrative:       The MDRD GFR formula is only valid for adults with stable renal function between ages 18 and 70.    BNP [[id number]]  (Normal) Collected:  09/09/17 0800    Specimen:  Blood Updated:  09/09/17 0836     proBNP 297.3 pg/mL     Narrative:       Among patients with dyspnea, NT-proBNP is highly sensitive for the detection of acute congestive heart failure. In addition NT-proBNP of <300 pg/ml effectively rules out acute congestive heart failure with 99% negative predictive value.    Troponin [[id number]]  (Normal) Collected:  09/09/17 0800    Specimen:  Blood Updated:  09/09/17 0838     Troponin T <0.010 ng/mL     Narrative:       Troponin T Reference Ranges:  Less than 0.03 ng/mL:    Negative for AMI  0.03 to 0.09 ng/mL:      Indeterminant for AMI  Greater than 0.09 ng/mL: Positive for AMI    CBC Auto Differential [[id number]]  (Abnormal) Collected:  09/09/17 0800    Specimen:  Blood Updated:  09/09/17 0821     WBC 8.32 10*3/mm3      RBC 4.02 (L) 10*6/mm3      Hemoglobin 11.9 (L) g/dL      Hematocrit 35.8 (L) %      MCV 89.1 fL      MCH 29.6 pg      MCHC 33.2 g/dL      RDW 13.1 %      RDW-SD 42.6 fl      MPV 9.9 fL      Platelets 81 (L) 10*3/mm3      Neutrophil % 80.2 (H) %      Lymphocyte % 12.0 (L) %      Monocyte % 6.7 %      Eosinophil % 0.4 %      Basophil % 0.1 %      Immature Grans % 0.6 (H) %      Neutrophils, Absolute 6.67 10*3/mm3      Lymphocytes, Absolute 1.00 10*3/mm3      Monocytes, Absolute 0.56 10*3/mm3      Eosinophils, Absolute 0.03 10*3/mm3      Basophils, Absolute 0.01 10*3/mm3      Immature Grans, Absolute 0.05 (H) 10*3/mm3     Urinalysis With / Culture If Indicated [[id number]]  (Abnormal) Collected:  09/09/17 1795    Specimen:  Urine from Urine, Catheter  Updated:  09/09/17 0853     Color, UA Orange (A)      Any Substance that causes an abnormal urine color can alter the accuracy of the chemical reactions.        Appearance, UA Cloudy (A)     pH, UA 5.5     Specific Gravity, UA 1.009     Glucose, UA Negative     Ketones, UA Negative     Bilirubin, UA Negative     Blood, UA Negative     Protein, UA Negative     Leuk Esterase, UA Moderate (2+) (A)     Nitrite, UA Positive (A)     Urobilinogen, UA 1.0 E.U./dL    Urinalysis, Microscopic Only [[id number]]  (Abnormal) Collected:  09/09/17 0833    Specimen:  Urine from Urine, Catheter Updated:  09/09/17 0853     RBC, UA 0-2 /HPF      WBC, UA 3-5 (A) /HPF      Bacteria, UA None Seen /HPF      Squamous Epithelial Cells, UA 0-2 /HPF      Hyaline Casts, UA 3-6 /LPF      Methodology Automated Microscopy    Urine Culture [[id number]] Collected:  09/09/17 0833    Specimen:  Urine from Urine, Catheter Updated:  09/09/17 0846          I ordered the above labs and reviewed the results    RADIOLOGY  XR Chest 2 View   FINDINGS: There are no prior exams for comparison. The heart is top  normal in size with sternal wires from previous cardiac surgery. There  is some minimal elevation of the left hemidiaphragm associated with some  minimal pleural thickening along the left lateral chest wall and likely  related to chronic change. There is also some minimal interstitial  change in the lateral aspect of the right upper lobe related to either  chronic scarring or minimal developing infiltrate.      CT Head Without Contrast           Discussed CT head with Dr. Dangelo (radiology) which showed nothing acute.     I ordered the above noted radiological studies. Interpreted by radiologist. Discussed with radiologist (Dr. Dangelo). Reviewed by me in PACS.       PROCEDURES  Procedures  EKG           EKG time: 0847  Rhythm/Rate: NSR, rate 67  P waves and MN: normal  QRS, axis: RAD, RBBB, Q wave in lead 3   ST and T waves: inverted ST wave in lead  V1     Interpreted Contemporaneously by me, independently viewed  No piror for comparison      PROGRESS AND CONSULTS  ED Course   Value Comment By Time   Creatinine: (!) 1.58 1.1 on 8/31/17 Chandana Khan MD 09/09 0902   Hemoglobin: (!) 11.9 stable Chandana Khan MD 09/09 0926     0743: Ordered EKG, labs, XR chest, and CT head for further evaluation.   0914: Ordered IVF.  0920:Rechecked pt, he was resting comfortably.Discussed CT head and XR chest which showed nothing acute. Discussed lab results which showed increased creatinine levels and urine which showed indications of infection. Discussed plan to consult with [facility] and for pt to ultimately have an evaluation by psych. Pt's family agrees with and understands plan to admit pt. All questions were addressed at this time.  0927: Placed call to [facility].  0939: Discussed pt's case with Dr. Gold ([facility]) who agrees to admit pt to a med surg bed.  0943 :Ordered rocephin for UTI.    MEDICAL DECISION MAKING  Results were reviewed/discussed with the patient and they were also made aware of online access. Pt also made aware that some labs, such as cultures, will not be resulted during ER visit and follow up with PMD is necessary.     MDM  Number of Diagnoses or Management Options  Acute kidney injury:   Hyperkalemia:   Psychosis, unspecified psychosis type:   Urinary tract infection associated with indwelling urethral catheter, initial encounter:   Diagnosis management comments: Patient was recently admitted to [facility] for MRSA UTI.  He was discharged home on Bactrim.  He was initially staying with his daughter but 2 days ago he moved back into his own home where he lives alone.  Early this morning, patient walked to his neighbor's house looking for his daughter and was confused.  While in the ER, the patient was noted to have visual hallucinations.  He was disoriented to time but the remainder of his neurological exam was normal.  Head CT was negative.   Patient's creatinine was elevated from baseline.  His urine also appeared to be infected.  Patient was given IV fluids and IV Rocephin.  His potassium was also mildly elevated.  Case was discussed Dr. Ly and he agreed to admit the patient.  Patient will need a psych consult as an inpatient.       Amount and/or Complexity of Data Reviewed  Clinical lab tests: ordered and reviewed (Creatinine: 1.58)  Tests in the radiology section of CPT®: ordered and reviewed (CT head: showed nothing actue)  Tests in the medicine section of CPT®: ordered and reviewed (See note)  Discussion of test results with the performing providers: yes (Dr. Dangelo (radiology))  Decide to obtain previous medical records or to obtain history from someone other than the patient: yes  Obtain history from someone other than the patient: yes (Pt's daughter and son)  Review and summarize past medical records: yes (Admitted 8/26/17 for MRSA UTI and COPD exacerbation and acute chronic psychosis. Urine was sensitive to bactrim. )  Discuss the patient with other providers: yes (Dr. Gold ([facility]))  Independent visualization of images, tracings, or specimens: yes    Patient Progress  Patient progress: stable         DIAGNOSIS  Final diagnoses:   Urinary tract infection associated with indwelling urethral catheter, initial encounter   Acute kidney injury   Hyperkalemia   Psychosis, unspecified psychosis type       DISPOSITION  ADMISSION    Discussed treatment plan and reason for admission with pt/family and admitting physician.  Pt/family voiced understanding of the plan for admission for further testing/treatment as needed.           Latest Documented Vital Signs:  As of 9:49 AM  BP- 139/64 HR- 67 Temp- 97.6 °F (36.4 °C) (Tympanic) O2 sat- 99%    --  Documentation assistance provided by zoey Marcial for Dr Khan.  Information recorded by the zoey was done at my direction and has been verified and validated by me.              Joana  Aggie  09/09/17 0950       Chandana Khan MD  09/09/17 1257

## 2017-09-10 ENCOUNTER — APPOINTMENT (OUTPATIENT)
Dept: CT IMAGING | Facility: HOSPITAL | Age: 82
End: 2017-09-10

## 2017-09-10 LAB
ANION GAP SERPL CALCULATED.3IONS-SCNC: 10.4 MMOL/L
BACTERIA SPEC AEROBE CULT: NO GROWTH
BACTERIA UR QL AUTO: ABNORMAL /HPF
BILIRUB UR QL STRIP: NEGATIVE
BUN BLD-MCNC: 19 MG/DL (ref 8–23)
BUN/CREAT SERPL: 13.7 (ref 7–25)
CALCIUM SPEC-SCNC: 8.4 MG/DL (ref 8.6–10.5)
CHLORIDE SERPL-SCNC: 108 MMOL/L (ref 98–107)
CLARITY UR: CLEAR
CO2 SERPL-SCNC: 23.6 MMOL/L (ref 22–29)
COLOR UR: YELLOW
CORTIS SERPL-MCNC: 8.52 MCG/DL
CREAT BLD-MCNC: 1.39 MG/DL (ref 0.76–1.27)
DEPRECATED RDW RBC AUTO: 44.3 FL (ref 37–54)
ERYTHROCYTE [DISTWIDTH] IN BLOOD BY AUTOMATED COUNT: 13.4 % (ref 11.5–14.5)
GFR SERPL CREATININE-BSD FRML MDRD: 48 ML/MIN/1.73
GLUCOSE BLD-MCNC: 89 MG/DL (ref 65–99)
GLUCOSE BLDC GLUCOMTR-MCNC: 109 MG/DL (ref 70–130)
GLUCOSE BLDC GLUCOMTR-MCNC: 69 MG/DL (ref 70–130)
GLUCOSE BLDC GLUCOMTR-MCNC: 86 MG/DL (ref 70–130)
GLUCOSE BLDC GLUCOMTR-MCNC: 88 MG/DL (ref 70–130)
GLUCOSE UR STRIP-MCNC: NEGATIVE MG/DL
HBA1C MFR BLD: 5.22 % (ref 4.8–5.6)
HCT VFR BLD AUTO: 32.4 % (ref 40.4–52.2)
HGB BLD-MCNC: 10.5 G/DL (ref 13.7–17.6)
HGB UR QL STRIP.AUTO: ABNORMAL
HYALINE CASTS UR QL AUTO: ABNORMAL /LPF
KETONES UR QL STRIP: NEGATIVE
LEUKOCYTE ESTERASE UR QL STRIP.AUTO: NEGATIVE
MAGNESIUM SERPL-MCNC: 2.2 MG/DL (ref 1.6–2.4)
MCH RBC QN AUTO: 29.4 PG (ref 27–32.7)
MCHC RBC AUTO-ENTMCNC: 32.4 G/DL (ref 32.6–36.4)
MCV RBC AUTO: 90.8 FL (ref 79.8–96.2)
NITRITE UR QL STRIP: NEGATIVE
PH UR STRIP.AUTO: <=5 [PH] (ref 5–8)
PHOSPHATE SERPL-MCNC: 4.3 MG/DL (ref 2.5–4.5)
PLATELET # BLD AUTO: 133 10*3/MM3 (ref 140–500)
PMV BLD AUTO: 9.1 FL (ref 6–12)
POTASSIUM BLD-SCNC: 5.1 MMOL/L (ref 3.5–5.2)
PROT UR QL STRIP: NEGATIVE
RBC # BLD AUTO: 3.57 10*6/MM3 (ref 4.6–6)
RBC # UR: ABNORMAL /HPF
REF LAB TEST METHOD: ABNORMAL
SODIUM BLD-SCNC: 142 MMOL/L (ref 136–145)
SP GR UR STRIP: 1.01 (ref 1–1.03)
SQUAMOUS #/AREA URNS HPF: ABNORMAL /HPF
T4 FREE SERPL-MCNC: 0.86 NG/DL (ref 0.93–1.7)
TSH SERPL DL<=0.05 MIU/L-ACNC: 2.37 MIU/ML (ref 0.27–4.2)
UROBILINOGEN UR QL STRIP: ABNORMAL
VANCOMYCIN SERPL-MCNC: 11.4 MCG/ML (ref 5–40)
VIT B12 BLD-MCNC: 256 PG/ML (ref 211–946)
WBC NRBC COR # BLD: 5.27 10*3/MM3 (ref 4.5–10.7)
WBC UR QL AUTO: ABNORMAL /HPF

## 2017-09-10 PROCEDURE — 99222 1ST HOSP IP/OBS MODERATE 55: CPT | Performed by: PSYCHIATRY & NEUROLOGY

## 2017-09-10 PROCEDURE — 84443 ASSAY THYROID STIM HORMONE: CPT | Performed by: HOSPITALIST

## 2017-09-10 PROCEDURE — 74176 CT ABD & PELVIS W/O CONTRAST: CPT

## 2017-09-10 PROCEDURE — 84100 ASSAY OF PHOSPHORUS: CPT | Performed by: HOSPITALIST

## 2017-09-10 PROCEDURE — 82533 TOTAL CORTISOL: CPT | Performed by: HOSPITALIST

## 2017-09-10 PROCEDURE — 80202 ASSAY OF VANCOMYCIN: CPT | Performed by: HOSPITALIST

## 2017-09-10 PROCEDURE — 86592 SYPHILIS TEST NON-TREP QUAL: CPT | Performed by: HOSPITALIST

## 2017-09-10 PROCEDURE — 83735 ASSAY OF MAGNESIUM: CPT | Performed by: HOSPITALIST

## 2017-09-10 PROCEDURE — 83921 ORGANIC ACID SINGLE QUANT: CPT | Performed by: PSYCHIATRY & NEUROLOGY

## 2017-09-10 PROCEDURE — 84439 ASSAY OF FREE THYROXINE: CPT | Performed by: HOSPITALIST

## 2017-09-10 PROCEDURE — 83036 HEMOGLOBIN GLYCOSYLATED A1C: CPT | Performed by: HOSPITALIST

## 2017-09-10 PROCEDURE — 82962 GLUCOSE BLOOD TEST: CPT

## 2017-09-10 PROCEDURE — 25010000002 ENOXAPARIN PER 10 MG: Performed by: HOSPITALIST

## 2017-09-10 PROCEDURE — 80048 BASIC METABOLIC PNL TOTAL CA: CPT | Performed by: HOSPITALIST

## 2017-09-10 PROCEDURE — 94799 UNLISTED PULMONARY SVC/PX: CPT

## 2017-09-10 PROCEDURE — 82607 VITAMIN B-12: CPT | Performed by: HOSPITALIST

## 2017-09-10 PROCEDURE — 81001 URINALYSIS AUTO W/SCOPE: CPT | Performed by: HOSPITALIST

## 2017-09-10 PROCEDURE — 85027 COMPLETE CBC AUTOMATED: CPT | Performed by: HOSPITALIST

## 2017-09-10 RX ADMIN — GLIPIZIDE 5 MG: 5 TABLET ORAL at 09:00

## 2017-09-10 RX ADMIN — RISPERIDONE 1 MG: 1 TABLET, FILM COATED ORAL at 17:38

## 2017-09-10 RX ADMIN — ENOXAPARIN SODIUM 30 MG: 30 INJECTION SUBCUTANEOUS at 09:00

## 2017-09-10 RX ADMIN — LEVOTHYROXINE SODIUM 25 MCG: 25 TABLET ORAL at 09:00

## 2017-09-10 RX ADMIN — MONTELUKAST 10 MG: 10 TABLET, FILM COATED ORAL at 20:42

## 2017-09-10 RX ADMIN — SODIUM CHLORIDE 100 ML/HR: 9 INJECTION, SOLUTION INTRAVENOUS at 06:42

## 2017-09-10 RX ADMIN — BUDESONIDE AND FORMOTEROL FUMARATE DIHYDRATE 2 PUFF: 80; 4.5 AEROSOL RESPIRATORY (INHALATION) at 21:04

## 2017-09-10 RX ADMIN — VANCOMYCIN HYDROCHLORIDE 750 MG: 750 INJECTION, POWDER, LYOPHILIZED, FOR SOLUTION INTRAVENOUS at 11:12

## 2017-09-10 RX ADMIN — PANTOPRAZOLE SODIUM 40 MG: 40 TABLET, DELAYED RELEASE ORAL at 08:59

## 2017-09-10 RX ADMIN — ACETAMINOPHEN 650 MG: 325 TABLET ORAL at 02:45

## 2017-09-10 RX ADMIN — HYDROXYZINE HYDROCHLORIDE 25 MG: 25 TABLET, FILM COATED ORAL at 17:38

## 2017-09-10 RX ADMIN — SERTRALINE 50 MG: 50 TABLET, FILM COATED ORAL at 08:59

## 2017-09-10 RX ADMIN — CHOLECALCIFEROL TAB 10 MCG (400 UNIT) 400 UNITS: 10 TAB at 09:00

## 2017-09-10 RX ADMIN — HYDROXYZINE HYDROCHLORIDE 25 MG: 25 TABLET, FILM COATED ORAL at 09:00

## 2017-09-10 RX ADMIN — MEMANTINE HYDROCHLORIDE 10 MG: 10 TABLET, FILM COATED ORAL at 08:59

## 2017-09-10 RX ADMIN — RISPERIDONE 1 MG: 1 TABLET, FILM COATED ORAL at 08:59

## 2017-09-10 RX ADMIN — TAMSULOSIN HYDROCHLORIDE 0.4 MG: 0.4 CAPSULE ORAL at 20:42

## 2017-09-10 RX ADMIN — ASPIRIN 325 MG: 325 TABLET ORAL at 09:00

## 2017-09-10 NOTE — PROGRESS NOTES
"Pharmacokinetic Consult - Vancomycin Dosing (Follow-up Note)    Juliano Garzon is on day 2 pharmacy to dose vancomycin for UTI.  Pharmacy dosing vancomycin per Dr. Gold's request.     Relevant clinical data and objective history reviewed:  87 y.o. male 67\" (170.2 cm) 152 lb 14.4 oz (69.4 kg)    Vital Signs (last 24 hours)       09/09 0700  -  09/10 0659 09/10 0700  -  09/10 0949   Most Recent    Temp (°F) 97.2 -  98.3      98     98 (36.7)    Heart Rate 63 -  74      72     72    Resp 15 -  16      16     16    /64 -  154/92      119/55     119/55    SpO2 (%) 97 -  100      99     99          Creatinine   Date Value Ref Range Status   09/10/2017 1.39 (H) 0.76 - 1.27 mg/dL Final   09/09/2017 1.58 (H) 0.76 - 1.27 mg/dL Final     BUN   Date Value Ref Range Status   09/10/2017 19 8 - 23 mg/dL Final     Estimated Creatinine Clearance: 36.8 mL/min (by C-G formula based on Cr of 1.39).    Lab Results   Component Value Date    WBC 5.27 09/10/2017       Baseline culture/source/susceptibility:  MRSA UTI 2 weeks ago treated at [facility]; presents to [facility] with AMS  9/9: Swab from Nares- No MRSA  9/9: Urine- NG    IV Anti-Infectives     Ordered     Dose/Rate Route Frequency Start Stop    09/09/17 1803  Vancomycin Pharmacy Intermittent Dosing     Ordering Provider:  Reinier Gold MD     Does not apply Daily 09/09/17 1845 09/09/17 1807  vancomycin 1500 mg/500 mL 0.9% NS IVPB ([facility])     Ordering Provider:  Reinier Gold MD    20 mg/kg × 69.4 kg  over 150 Minutes Intravenous Once 09/09/17 1845 09/09/17 2237    09/09/17 1732  Pharmacy to dose vancomycin     Ordering Provider:  Reinier Gold MD     Does not apply Continuous PRN 09/09/17 1725      09/09/17 0943  cefTRIAXone (ROCEPHIN) IVPB 1 g     Ordering Provider:  Chandana Khan MD    1 g  over 30 Minutes Intravenous Once 09/09/17 0945 09/09/17 1039       Lab Results   Component Value Date    VancoRandom 11.40 09/10/2017     Vancomycin " Dosing History:  9/9 2007: Vancomycin 1.5 gm iv x 1 dose  9/10 0759: Random level= 11.4 mcg/ml    Assessment/Plan  Reviewed chart.  Renal function is improving.     1. Vancomycin 750 mg iv x 1 dose today  2. Vancomycin random level in AM  3. Creatinine in AM    Pharmacy will continue to follow daily while on vancomycin and adjust as needed.     Silva Nazario, PharmD, BCPS

## 2017-09-10 NOTE — CONSULTS
Urology Consult  Patient Identification:  Name: Juliano Garzon  Age: 87 y.o.  Sex: male  : 1929  MRN: [id number]   Chief Complaint: urinary retention  History of Present Illness:   87 yr old male not known to our practice, was recently at [place] for RENZO and infection, likely saw someone from our group there but records are not obtainable at this time and there are no records of him in our office EMR.  He is admitted for worsening confusion, had moses cath when discharged from [facility] but when he was found walking around the neighborhood his catheter had been cut from the bag.  Currently in no distress, resting comfortably, moses in place with clear output, cr  was 1.5, ucx negative, I do not see any imaging on his kidneys.    Problem List:  [unfilled]  Past Medical History:  Past Medical History:   Diagnosis Date   • COPD (chronic obstructive pulmonary disease)    • Dementia    • Diabetes mellitus    • Disease of thyroid gland    • GERD (gastroesophageal reflux disease)      Past Surgical History:  History reviewed. No pertinent surgical history.   Home Meds:  Prescriptions Prior to Admission   Medication Sig Dispense Refill Last Dose   • aspirin 325 MG tablet Take 325 mg by mouth Daily.      • Fluticasone Furoate-Vilanterol (BREO ELLIPTA) 100-25 MCG/INH aerosol powder  Inhale Daily.      • glipiZIDE (GLUCOTROL) 5 MG tablet Take 5 mg by mouth Daily.      • haloperidol (HALDOL) 1 MG tablet Take 1 mg by mouth 2 (Two) Times a Day.      • hydrOXYzine (ATARAX) 25 MG tablet Take 25 mg by mouth 2 (Two) Times a Day.      • levothyroxine (SYNTHROID, LEVOTHROID) 25 MCG tablet Take 25 mcg by mouth Daily.      • LORazepam (ATIVAN) 0.5 MG tablet Take 0.5 mg by mouth Daily.      • memantine (NAMENDA) 10 MG tablet Take 10 mg by mouth Daily.      • metFORMIN (GLUCOPHAGE) 500 MG tablet Take 500 mg by mouth 2 (Two) Times a Day With Meals.      • montelukast (SINGULAIR) 10 MG tablet Take 10 mg by mouth Every Night.     "  • pantoprazole (PROTONIX) 40 MG EC tablet Take 40 mg by mouth Daily.      • risperiDONE (risperDAL) 1 MG tablet Take 1 mg by mouth 2 (Two) Times a Day.      • sertraline (ZOLOFT) 50 MG tablet Take 50 mg by mouth Daily.      • tamsulosin (FLOMAX) 0.4 MG capsule 24 hr capsule Take 1 capsule by mouth Every Night.      • Vitamin D, Cholecalciferol, (CHOLECALCIFEROL) 400 units tablet Take 400 Units by mouth Daily.        Current Meds:   [unfilled]  Allergies:  No Known Allergies  Immunizations:    There is no immunization history on file for this patient.  Social History:   Social History   Substance Use Topics   • Smoking status: Former Smoker     Types: Cigarettes   • Smokeless tobacco: Not on file   • Alcohol use No      Family History:  History reviewed. No pertinent family history.   Review of Systems  negative 12 point system review except: urinary retention  Objective:  tMax 24 hrs: Temp (24hrs), Av.9 °F (36.6 °C), Min:97.2 °F (36.2 °C), Max:98.3 °F (36.8 °C)    Vitals Ranges:   Temp:  [97.2 °F (36.2 °C)-98.3 °F (36.8 °C)] 97.2 °F (36.2 °C)  Heart Rate:  [63-74] 68  Resp:  [15-16] 16  BP: (127-154)/(54-92) 141/70  Intake and Output Last 3 Shifts:   I/O last 3 completed shifts:  In: -   Out: 2850 [Urine:2850]  Exam:  /70 (BP Location: Right arm, Patient Position: Lying)  Pulse 68  Temp 97.2 °F (36.2 °C) (Oral)   Resp 16  Ht 67\" (170.2 cm)  Wt 152 lb 14.4 oz (69.4 kg)  SpO2 97%  BMI 23.95 kg/m2      General Appearance: Alert, cooperative, no distress, appears stated age   Head: Normocephalic, without obvious abnormality, atraumatic   ENT: Normal hearing, external inspection, ears and nose   Eyes: Normal pupils/iris, external inspection, conjunctiva, eye lids   Back: No CVA tenderness   Respiratory: Normal effort, palpation, auscultation   CV: Normal auscultation, carotid pulses, no edema   Abdomen: No hernia, soft, non tender, no masses   : Gonsalez in place   Musculoskeletal: Normal extremities, " nails, digitis   Skin: Normal skin color, texture, no rashes or lesion   Neurologic/psych: Confused, normal mood, affect           Data Review:  CBC:   Results from last 7 days  Lab Units 09/09/17  0800   WBC 10*3/mm3 8.32   RBC 10*6/mm3 4.02*     BMP:   Results from last 7 days  Lab Units 09/09/17  0800   GLUCOSE mg/dL 109*   CO2 mmol/L 25.4   BUN mg/dL 27*   CREATININE mg/dL 1.58*   CALCIUM mg/dL 9.0      Assessment:  Principal Problem:    RENZO (acute kidney injury)  Active Problems:    Urinary tract infection associated with indwelling urethral catheter    Disease of thyroid gland    COPD (chronic obstructive pulmonary disease)    Chronic respiratory failure    Dementia    Hyperkalemia    Hyponatremia    Psychosis    Urinary retention    DM II (diabetes mellitus, type II), controlled    Urinary retention - moses in place, clear yellow urine  UTI while at [place] - ucx 9/9 negative    Plan:  Keep moses  Add flomax  Check GELACIO for hydro  Labs pending  Voiding trial when stable    RAMESH Gonzales  9/10/2017

## 2017-09-10 NOTE — PROGRESS NOTES
"DAILY PROGRESS NOTE  [facility]    Patient Identification:  Name: Juliano Garzon  Age: 87 y.o.  Sex: male  :  1929  MRN: [id number]         Primary Care Physician: No Known Provider      Subjective  No c/o.  Feels well.     Objective:  General Appearance:  Comfortable, well-appearing, in no acute distress and not in pain.    Vital signs: (most recent): Blood pressure 134/61, pulse 64, temperature 98.5 °F (36.9 °C), temperature source Oral, resp. rate 16, height 67\" (170.2 cm), weight 152 lb 14.4 oz (69.4 kg), SpO2 98 %.    Lungs:  Normal respiratory rate and normal effort.  Breath sounds clear to auscultation.    Heart: Normal rate.  Regular rhythm.    Extremities: There is no dependent edema.    Neurological: Patient is alert.  (Oriented to person.  conversant and cooperative. ).    Skin:  Warm and dry.                Vital signs in last 24 hours:  Temp:  [97.2 °F (36.2 °C)-98.5 °F (36.9 °C)] 98.5 °F (36.9 °C)  Heart Rate:  [64-74] 64  Resp:  [16] 16  BP: (119-141)/(55-70) 134/61    Intake/Output:    Intake/Output Summary (Last 24 hours) at 09/10/17 1748  Last data filed at 09/10/17 06   Gross per 24 hour   Intake                0 ml   Output             1600 ml   Net            -1600 ml           Results from last 7 days  Lab Units 09/10/17  0759 17  0800   WBC 10*3/mm3 5.27 8.32   HEMOGLOBIN g/dL 10.5* 11.9*   PLATELETS 10*3/mm3 133* 81*     Results from last 7 days  Lab Units 09/10/17  0759 17  0800   SODIUM mmol/L 142 135*   POTASSIUM mmol/L 5.1 5.8*   CHLORIDE mmol/L 108* 100   CO2 mmol/L 23.6 25.4   BUN mg/dL 19 27*   CREATININE mg/dL 1.39* 1.58*   GLUCOSE mg/dL 89 109*   Estimated Creatinine Clearance: 36.8 mL/min (by C-G formula based on Cr of 1.39).  Results from last 7 days  Lab Units 09/10/17  0759 17  0800   CALCIUM mg/dL 8.4* 9.0   ALBUMIN g/dL  --  3.40*   MAGNESIUM mg/dL 2.2  --    PHOSPHORUS mg/dL 4.3  --      Results from last 7 days  Lab Units " 09/09/17  0800   ALBUMIN g/dL 3.40*   BILIRUBIN mg/dL 0.2   ALK PHOS U/L 51   AST (SGOT) U/L 15   ALT (SGPT) U/L 15       Assessment:  Principal Problem:   Renal failure:  Baseline not known.  Awaiting records from  [facility]  Active Problems:    Urinary tract infection associated with indwelling urethral catheter:  Repeat U/A not run.  Will re-order.  Original cult neg.     Dementia w hallucinations.     Hypothyroid.     COPD (chronic obstructive pulmonary disease)    Chronic respiratory failure    Hyperkalemia - resolved.     Hyponatremia - resolved.     Urinary retention    DM II (diabetes mellitus, type II):  BG running a little low.  D/C glucotrol and monitor.       Plan:  Please see above.   Urology, Neurology input appreciated.   Wean IVF.    D/C planning.     Reinier Gold MD  9/10/2017  5:48 PM

## 2017-09-10 NOTE — CONSULTS
CC: Confusion and memory loss    HPI:  Juliano Garzon is a  87 y.o.  right-handed white male who I have been asked to see by Dr. Gold regarding memory loss and confusion and hallucinations.  Some of the records or from the chart as the patient's memory is insufficient to provide an accurate history.  No family members are present at this time.  The patient was admitted to [facility] about 2 weeks ago with a urinary tract infection/pyelonephritis.  He was treated with vancomycin and Bactrim.  Gonsalez catheter was placed.  He apparently was on home oxygen.    He apparently was found wandering the neighborhood without oxygen and having cut his catheter.  He was brought to this emergency room for evaluation.  There is report of oxygen saturations in the 70% range blood sugar of 129 by EMS.  He was admitted yesterday morning.  A head CT was obtained showing mild to moderate small vessel changes.  I reviewed the films and the degree of cerebral atrophy and small vessel change looks age-appropriate to me however there is some left greater than right anterior temporal lobe atrophy with dilation of the left temporal tip.  Chest film showed some elevation of the left hemidiaphragm and some stranding in the left base consistent with atelectasis/congestion/developing infiltrate.  Additional testing that has occurred since admission included a CBC showing some anemia with hemoglobin around 10.5.  His platelet count initially was low at 81,000 and upon repeated was 133,000.  No white count elevation.  Initial creatinine was elevated at 1.5 a and then follow-up was 1.39.  BUN was also elevated at 27 which dropped to 19.  Urinalysis showed 3-5 white cells and 3-6 [place] cast.  Urine tox screen was negative.  Vitamin B12 level was 256.  EKG showed sinus rhythm at 67 with right bundle branch block.    The patient indicates that he lives alone.  About 3-1/2 years ago his wife .  His daughter lives about 3 miles from  "him.  He states that for quite some time now he has been having formed visual hallucinations of people in the house.  He says between 40 and 80 people live there.  He says he sees their mouth smooth as if they're talking but no words come out.  It is rare to occur in the daytime for typically occurs about 10:30 at night and is basically present every night.  It causes sleep disruption.  He also describes memory problems \"for a while\".  He says a few weeks.    He recently fell off of his front steps after he walked to the neighborhood apparently is a  and told him there were people in his house.  The  apparently came over and as the patient puts it older people to leave in the left.  However he fell as he climbed up on a step and scuffs his knees and left elbow.  He says he didn't hit his head.  He denies history of concussion meningitis seizures known strokes or syncope.    He states his mother had 4 strokes late in life.  No one else was strokes brain tumor brain hemorrhage or aneurysm of a brain artery.  He states that no one that he knows of in the family had memory problems.  He denies a previous treatment with medications for memory.  He states that his daughter sees him most days and fixes his medications for him.  Apparently after his most recent admission a couple of weeks ago he was sent home with the daughter but he did not want to live there and he went back to living at his own house.        Past Medical History:   Diagnosis Date   • COPD (chronic obstructive pulmonary disease)    • Dementia    • Diabetes mellitus    • Disease of thyroid gland    • GERD (gastroesophageal reflux disease)          History reviewed. No pertinent surgical history.        Current Facility-Administered Medications:   •  acetaminophen (TYLENOL) tablet 650 mg, 650 mg, Oral, Q4H PRN, Reinier Gold MD, 650 mg at 09/10/17 6795  •  aspirin tablet 325 mg, 325 mg, Oral, Daily, Reinier Gold MD, 325 mg at " 09/10/17 0900  •  budesonide-formoterol (SYMBICORT) 80-4.5 MCG/ACT inhaler 2 puff, 2 puff, Inhalation, BID - RT, Reinier Gold MD, 2 puff at 09/09/17 1942  •  cholecalciferol (VITAMIN D3) tablet 400 Units, 400 Units, Oral, Daily, Reinier Gold MD, 400 Units at 09/10/17 0900  •  dextrose (D50W) solution 25 g, 25 g, Intravenous, Q15 Min PRN, Reinier Gold MD  •  dextrose (GLUTOSE) oral gel 15 g, 15 g, Oral, Q15 Min PRN, Reinier Gold MD  •  enoxaparin (LOVENOX) syringe 30 mg, 30 mg, Subcutaneous, Daily, Reinier Gold MD, 30 mg at 09/10/17 0900  •  glipiZIDE (GLUCOTROL) tablet 5 mg, 5 mg, Oral, Daily, Reinier Gold MD, 5 mg at 09/10/17 0900  •  glucagon (human recombinant) (GLUCAGEN DIAGNOSTIC) injection 1 mg, 1 mg, Subcutaneous, Q15 Min PRN, Reinier Gold MD  •  hydrOXYzine (ATARAX) tablet 25 mg, 25 mg, Oral, BID, Reinier Gold MD, 25 mg at 09/10/17 0900  •  Influenza Vac Subunit Quad (FLUCELVAX) injection 0.5 mL, 0.5 mL, Intramuscular, During Hospitalization, Reinier Gold MD  •  insulin aspart (novoLOG) injection 0-7 Units, 0-7 Units, Subcutaneous, 4x Daily With Meals & Nightly, Reinier Gold MD  •  levothyroxine (SYNTHROID, LEVOTHROID) tablet 25 mcg, 25 mcg, Oral, Daily, Reinier Gold MD, 25 mcg at 09/10/17 0900  •  memantine (NAMENDA) tablet 10 mg, 10 mg, Oral, Daily, Reinier Gold MD, 10 mg at 09/10/17 0859  •  montelukast (SINGULAIR) tablet 10 mg, 10 mg, Oral, Nightly, Reinier Gold MD, 10 mg at 09/09/17 2007  •  ondansetron (ZOFRAN) tablet 4 mg, 4 mg, Oral, Q6H PRN **OR** ondansetron ODT (ZOFRAN-ODT) disintegrating tablet 4 mg, 4 mg, Oral, Q6H PRN **OR** ondansetron (ZOFRAN) injection 4 mg, 4 mg, Intravenous, Q6H PRN, Reinier Gold MD  •  pantoprazole (PROTONIX) EC tablet 40 mg, 40 mg, Oral, Daily, Reinier Gold MD, 40 mg at 09/10/17 0859  •  Pharmacy to dose vancomycin, , Does not apply, Continuous PRN, Reinier OAKLEY  MD Alla  •  risperiDONE (risperDAL) tablet 1 mg, 1 mg, Oral, BID, Reinier Gold MD, 1 mg at 09/10/17 0859  •  sertraline (ZOLOFT) tablet 50 mg, 50 mg, Oral, Daily, Reinier Gold MD, 50 mg at 09/10/17 0859  •  sodium chloride 0.9 % flush 1-10 mL, 1-10 mL, Intravenous, PRN, Reinier Gold MD  •  Insert peripheral IV, , , Once **AND** sodium chloride 0.9 % flush 10 mL, 10 mL, Intravenous, PRN, Chandana Khan MD  •  sodium chloride 0.9 % infusion, 100 mL/hr, Intravenous, Continuous, Reinier Gold MD, Last Rate: 100 mL/hr at 09/10/17 0642, 100 mL/hr at 09/10/17 0642  •  tamsulosin (FLOMAX) 24 hr capsule 0.4 mg, 0.4 mg, Oral, Nightly, Reinier Gold MD, 0.4 mg at 09/09/17 2007  •  vancomycin 750 mg/250 mL 0.9% NS add-vantage, 750 mg, Intravenous, Once, Reinier Gold MD, 750 mg at 09/10/17 1112  •  Vancomycin Pharmacy Intermittent Dosing, , Does not apply, Daily, Reinier Gold MD      History reviewed. No pertinent family history.      Social History     Social History   • Marital status: Single     Spouse name: N/A   • Number of children: N/A   • Years of education: N/A     Occupational History   • Not on file.     Social History Main Topics   • Smoking status: Former Smoker     Types: Cigarettes   • Smokeless tobacco: Not on file   • Alcohol use No   • Drug use: No   • Sexual activity: Not on file     Other Topics Concern   • Not on file     Social History Narrative   • No narrative on file         No Known Allergies      Pain Scale:2/10        ROS:  Review of Systems   Constitutional: Negative for activity change, appetite change and fatigue.   HENT: Negative for rhinorrhea and trouble swallowing. Negative for ear pain, facial swelling.  Long-standing right-sided hearing loss greater than left hearing loss.    Eyes: Negative for visual disturbance with exception of formed visual hallucinations.. Negative for pain, redness and itching.   Respiratory: Negative for cough,  choking and shortness of breath.  However he apparently was on home oxygen.    Cardiovascular: Negative for chest pain and leg swelling.   Gastrointestinal: Negative for abdominal pain, nausea and vomiting.   Endocrine: Negative for cold intolerance and heat intolerance.   Musculoskeletal: He describes recent back pain across the back and states it didn't radiate around front or down the leg on either side   Skin: Negative for color change and rash.   Allergic/Immunologic: Negative for environmental allergies. Negative for food allergies.   Neurological: Negative for tremors and numbness. Negative for dizziness, seizures, syncope, facial asymmetry, speech difficulty, weakness, light-headedness and headaches.   Hematological: Negative for adenopathy. Does not bruise/bleed easily.   Psychiatric/Behavioral: Negative for agitation, behavioral problems, dysphoric mood, self-injury, and suicidal ideas. The patient is not nervous/anxious and is not hyperactive.    Positive for visual hallucinations of people particularly at night as well as memory problems          Physical Exam:  Vitals:    09/09/17 1942 09/09/17 1957 09/09/17 2300 09/10/17 0835   BP:  127/64 141/70 119/55   BP Location:  Right arm Right arm Right arm   Patient Position:  Lying Lying Lying   Pulse: 74 74 68 72   Resp: 16 16 16 16   Temp:  98 °F (36.7 °C) 97.2 °F (36.2 °C) 98 °F (36.7 °C)   TempSrc:  Oral Oral Oral   SpO2: 100% 100% 97% 99%   Weight:       Height:         Body mass index is 23.95 kg/(m^2).    Physical Exam  Gen.: Well-developed white male no acute distress  HEENT: Normocephalic no evidence of trauma.  Discs are poorly seen.  Throat negative.  Neck: Supple.  No thyromegaly.  There are bilateral carotid and subclavian bruits versus transmitted cardiac murmur from the aortic valve.  Heart: Regular rate and rhythm with a grade 2/6 systolic ejection murmur at the upper right sternal border.  Mental status: Patient is awake and alert.  He is not  oriented.  He states he was in the hospital but didn't know the city or state.  He knew that he lived in [facility].  He knew it was 2017 and said it was fall and said the month was August.  Immediate recall was 3/3 but delayed recall was 0/3 that he could not perform serial sevens.  He couldn't draw intersecting pentagons and a clock face with slight correction.  Mini-Mental state score was 13/30 with most of the errors being on orientation serial sevens and delayed recall.  Clock draw was 4/4 with slight modification.  Animal naming was 8 animals in 1 minute but he did persist throughout the one time..  Cranial nerves: 2-12 intact with exception of severe hearing loss right ear and moderate on the left.  Motor: Normal tone and bulk.  Full power in all muscles tested in the arms and legs.  Reflexes are absent with downgoing toe signs.  Sensory: Light touch and pinprick are diffusely intact with exception of perhaps an prick in the first 3 digits of the left hand.  Vibration sense was intact at the ankles and position sense intact in the great toes.  Cerebellar: Finger to nose rapid movements heel-to-shin were not dysmetria  Gait and Station: wasn't tested          Results:      Lab Results   Component Value Date    GLUCOSE 89 09/10/2017    BUN 19 09/10/2017    CREATININE 1.39 (H) 09/10/2017    EGFRIFNONA 48 (L) 09/10/2017    BCR 13.7 09/10/2017    CO2 23.6 09/10/2017    CALCIUM 8.4 (L) 09/10/2017    ALBUMIN 3.40 (L) 09/09/2017    LABIL2 1.0 09/09/2017    AST 15 09/09/2017    ALT 15 09/09/2017       Lab Results   Component Value Date    WBC 5.27 09/10/2017    HGB 10.5 (L) 09/10/2017    HCT 32.4 (L) 09/10/2017    MCV 90.8 09/10/2017     (L) 09/10/2017         .No results found for: RPR      Lab Results   Component Value Date    TSH 2.370 09/10/2017         Lab Results   Component Value Date    [id number] 256 09/10/2017         No results found for: FOLATE      Lab Results   Component Value Date    HGBA1C  5.22 09/10/2017     Head CT films reviewed as above.  ER and admission notes were reviewed as noted above        Assessment:   1.  Dementia with pertinent features of delayed recall deficits with orientation deficits and calculation deficits.  Constructional dyspraxia is not prominent.  Executive dysfunctioning appears prominent.  Other pertinent features are formed visual hallucinations on a regular basis.  The pattern is not typical of Alzheimer's disease but may be a frontotemporal degeneration with primarily left temporal involvement.  This would be supported by the CT findings.  2.  Vitamin B12 level was in the lower range of normal and so I will check a methylmalonic acid level as well as a sedimentation rate and an RPR for completion.  Note that his free T4 was a little low but TSH was normal.        Plan:  1.  The patient needs [facility] care whether that's with family or in an ECF.  2.  It appears that he was on memantine as an outpatient.  Option to initiate Aricept 5 mg nightly.  He may have had other treatments that he is not recalling.  His daughter may know additional information.  It appears that he had been placed on Haldol also as an outpatient with obvious attempt to progress visual hallucinations.  He has been started on risperidone in the hospital which should have less parkinsonian side effects as well as less cardiac side effects.  3.  Check EEG for focal slowing                            .

## 2017-09-10 NOTE — NURSING NOTE
Called [facility] 3 times to obtain medical records per MD request. Unable to reach department because my called was routed to a [facility] residence instead.

## 2017-09-10 NOTE — PLAN OF CARE
Problem: Patient Care Overview (Adult)  Goal: Plan of Care Review  Outcome: Ongoing (interventions implemented as appropriate)    09/10/17 1703   Coping/Psychosocial Response Interventions   Plan Of Care Reviewed With patient   Patient Care Overview   Progress no change   Outcome Evaluation   Outcome Summary/Follow up Plan Pt. with no attempts to get out of bed today. Has spent most of day sleeping. Wakes for meals, appetite excellence No complaints of pain or discomfort. Assessed by neuro, shows signs of dementia. Will monitor. 09/10/17         Problem: Fall Risk (Adult)  Goal: Identify Related Risk Factors and Signs and Symptoms  Outcome: Ongoing (interventions implemented as appropriate)  Goal: Absence of Falls  Outcome: Ongoing (interventions implemented as appropriate)    Problem: Infection, Risk/Actual (Adult)  Goal: Identify Related Risk Factors and Signs and Symptoms  Outcome: Ongoing (interventions implemented as appropriate)  Goal: Infection Prevention/Resolution  Outcome: Ongoing (interventions implemented as appropriate)    Problem: Confusion, Acute (Adult)  Goal: Identify Related Risk Factors and Signs and Symptoms  Outcome: Ongoing (interventions implemented as appropriate)  Goal: Cognitive/Functional Impairments Minimized  Outcome: Ongoing (interventions implemented as appropriate)  Goal: Safety  Outcome: Ongoing (interventions implemented as appropriate)

## 2017-09-11 LAB
ANION GAP SERPL CALCULATED.3IONS-SCNC: 10.1 MMOL/L
BUN BLD-MCNC: 18 MG/DL (ref 8–23)
BUN/CREAT SERPL: 15.1 (ref 7–25)
CALCIUM SPEC-SCNC: 8.4 MG/DL (ref 8.6–10.5)
CHLORIDE SERPL-SCNC: 106 MMOL/L (ref 98–107)
CO2 SERPL-SCNC: 22.9 MMOL/L (ref 22–29)
CREAT BLD-MCNC: 1.19 MG/DL (ref 0.76–1.27)
ERYTHROCYTE [SEDIMENTATION RATE] IN BLOOD: 36 MM/HR (ref 0–20)
GFR SERPL CREATININE-BSD FRML MDRD: 58 ML/MIN/1.73
GLUCOSE BLD-MCNC: 99 MG/DL (ref 65–99)
GLUCOSE BLDC GLUCOMTR-MCNC: 104 MG/DL (ref 70–130)
GLUCOSE BLDC GLUCOMTR-MCNC: 122 MG/DL (ref 70–130)
GLUCOSE BLDC GLUCOMTR-MCNC: 146 MG/DL (ref 70–130)
GLUCOSE BLDC GLUCOMTR-MCNC: 96 MG/DL (ref 70–130)
MRSA SPEC QL CULT: NORMAL
POTASSIUM BLD-SCNC: 5.1 MMOL/L (ref 3.5–5.2)
RPR SER QL: NORMAL
SODIUM BLD-SCNC: 139 MMOL/L (ref 136–145)
VANCOMYCIN SERPL-MCNC: 9 MCG/ML (ref 5–40)

## 2017-09-11 PROCEDURE — 97162 PT EVAL MOD COMPLEX 30 MIN: CPT

## 2017-09-11 PROCEDURE — 94799 UNLISTED PULMONARY SVC/PX: CPT

## 2017-09-11 PROCEDURE — 25010000002 ENOXAPARIN PER 10 MG: Performed by: HOSPITALIST

## 2017-09-11 PROCEDURE — 80202 ASSAY OF VANCOMYCIN: CPT | Performed by: HOSPITALIST

## 2017-09-11 PROCEDURE — 80048 BASIC METABOLIC PNL TOTAL CA: CPT | Performed by: HOSPITALIST

## 2017-09-11 PROCEDURE — 85652 RBC SED RATE AUTOMATED: CPT | Performed by: PSYCHIATRY & NEUROLOGY

## 2017-09-11 PROCEDURE — 82962 GLUCOSE BLOOD TEST: CPT

## 2017-09-11 RX ORDER — DEXTROSE AND SODIUM CHLORIDE 5; .45 G/100ML; G/100ML
50 INJECTION, SOLUTION INTRAVENOUS CONTINUOUS
Status: DISCONTINUED | OUTPATIENT
Start: 2017-09-11 | End: 2017-09-12

## 2017-09-11 RX ADMIN — MONTELUKAST 10 MG: 10 TABLET, FILM COATED ORAL at 21:16

## 2017-09-11 RX ADMIN — RISPERIDONE 1 MG: 1 TABLET, FILM COATED ORAL at 09:23

## 2017-09-11 RX ADMIN — PANTOPRAZOLE SODIUM 40 MG: 40 TABLET, DELAYED RELEASE ORAL at 09:23

## 2017-09-11 RX ADMIN — ASPIRIN 325 MG: 325 TABLET ORAL at 09:23

## 2017-09-11 RX ADMIN — MEMANTINE HYDROCHLORIDE 10 MG: 10 TABLET, FILM COATED ORAL at 09:22

## 2017-09-11 RX ADMIN — TAMSULOSIN HYDROCHLORIDE 0.4 MG: 0.4 CAPSULE ORAL at 21:16

## 2017-09-11 RX ADMIN — HYDROXYZINE HYDROCHLORIDE 25 MG: 25 TABLET, FILM COATED ORAL at 09:23

## 2017-09-11 RX ADMIN — RISPERIDONE 1 MG: 1 TABLET, FILM COATED ORAL at 17:17

## 2017-09-11 RX ADMIN — LEVOTHYROXINE SODIUM 25 MCG: 25 TABLET ORAL at 09:23

## 2017-09-11 RX ADMIN — HYDROXYZINE HYDROCHLORIDE 25 MG: 25 TABLET, FILM COATED ORAL at 17:17

## 2017-09-11 RX ADMIN — BUDESONIDE AND FORMOTEROL FUMARATE DIHYDRATE 2 PUFF: 80; 4.5 AEROSOL RESPIRATORY (INHALATION) at 19:20

## 2017-09-11 RX ADMIN — DEXTROSE AND SODIUM CHLORIDE 50 ML/HR: 5; 450 INJECTION, SOLUTION INTRAVENOUS at 09:23

## 2017-09-11 RX ADMIN — CHOLECALCIFEROL TAB 10 MCG (400 UNIT) 400 UNITS: 10 TAB at 09:23

## 2017-09-11 RX ADMIN — BUDESONIDE AND FORMOTEROL FUMARATE DIHYDRATE 2 PUFF: 80; 4.5 AEROSOL RESPIRATORY (INHALATION) at 08:43

## 2017-09-11 RX ADMIN — ENOXAPARIN SODIUM 30 MG: 30 INJECTION SUBCUTANEOUS at 09:27

## 2017-09-11 RX ADMIN — SERTRALINE 50 MG: 50 TABLET, FILM COATED ORAL at 09:23

## 2017-09-11 NOTE — DISCHARGE PLACEMENT REQUEST
"Doris To (87 y.o. Male)     Date of Birth Social Security Number Address Home Phone MRN    12/30/1929  [address] [place] [id number] [phone] [id number]    Mandaeism Marital Status          None Single       Admission Date Admission Type Admitting Provider Attending Provider Department, Room/Bed    9/9/17 Emergency Reinier Gold MD Richards, Stephen J, MD [address], 609/1    Discharge Date Discharge Disposition Discharge Destination                      Attending Provider: Addy Pedraza MD     Allergies:  No Known Allergies    Isolation:  None   Infection:  None   Code Status:  Conditional    Ht:  67\" (170.2 cm)   Wt:  152 lb 14.4 oz (69.4 kg)    Admission Cmt:  None   Principal Problem:  RENZO (acute kidney injury) [N17.9]                 Active Insurance as of 9/9/2017     Primary Coverage     Payor Plan Insurance Group Employer/Plan Group    MEDICARE MEDICARE A & B      Payor Plan Address Payor Plan Phone Number Effective From Effective To    [address] [phone] 12/1/1994     [place] [address]       Subscriber Name Subscriber Birth Date Member ID       DORIS TO 12/30/1929 [id number]           Secondary Coverage     Payor Plan Insurance Group Employer/Plan Group    [facility] [id number]     Payor Plan Address Payor Plan Phone Number Effective From Effective To    [address]  12/1/2016     [place] [address]       Subscriber Name Subscriber Birth Date Member ID       DORIS TO 12/30/1929 [id number]                 Emergency Contacts      (Rel.) Home Phone Work Phone Mobile Phone    CrewKatie (Daughter) [phone] -- --              "

## 2017-09-11 NOTE — PROGRESS NOTES
LOS: 2 days     Name: Juliano Garzon  Age/Sex: 87 y.o. male  :  1929        PCP: Renzo Valdez MD    Subjective   Feeling better no further hallucinations or confusion per nursing.  Does not remain oriented throughout the day needs redirection.    General: No Fever or Chills, Cardiac: No Chest Pain or Palpitations, Resp: No Cough or SOA, GI: No Nausea, Vomiting, or Diarrhea and Other: No bleeding      aspirin 325 mg Oral Daily   budesonide-formoterol 2 puff Inhalation BID - RT   cholecalciferol 400 Units Oral Daily   enoxaparin 30 mg Subcutaneous Daily   hydrOXYzine 25 mg Oral BID   levothyroxine 25 mcg Oral Daily   memantine 10 mg Oral Daily   montelukast 10 mg Oral Nightly   pantoprazole 40 mg Oral Daily   risperiDONE 1 mg Oral BID   sertraline 50 mg Oral Daily   tamsulosin 0.4 mg Oral Nightly       dextrose 5 % and sodium chloride 0.45 % 50 mL/hr Last Rate: 50 mL/hr (17 0923)       Objective   Vital Signs  Temp:  [97.5 °F (36.4 °C)-97.9 °F (36.6 °C)] 97.6 °F (36.4 °C)  Heart Rate:  [63-81] 74  Resp:  [16-18] 18  BP: (124-139)/(53-72) 127/60  Body mass index is 23.95 kg/(m^2).    Intake/Output Summary (Last 24 hours) at 17 1542  Last data filed at 17 0455   Gross per 24 hour   Intake              900 ml   Output             1800 ml   Net             -900 ml       Physical Exam   Constitutional: He appears well-developed and well-nourished.   HENT:   Head: Normocephalic and atraumatic.   Eyes: EOM are normal.   Neck: Neck supple. No JVD present.   Cardiovascular: Normal rate, regular rhythm and normal heart sounds.    Pulmonary/Chest: Effort normal and breath sounds normal.   Abdominal: Soft. Bowel sounds are normal. He exhibits no distension.   Musculoskeletal: Normal range of motion. He exhibits no edema.   Neurological: He is alert.   Oriented to person and place   Skin: Skin is warm and dry.   Psychiatric: He has a normal mood and affect. His behavior is normal.    Nursing note reviewed.        Results Review:       I reviewed the patient's new clinical results.    Results from last 7 days  Lab Units 09/10/17  0759 09/09/17  0800   WBC 10*3/mm3 5.27 8.32   HEMOGLOBIN g/dL 10.5* 11.9*   PLATELETS 10*3/mm3 133* 81*     Results from last 7 days  Lab Units 09/11/17  0554 09/10/17  0759 09/09/17  0800   SODIUM mmol/L 139 142 135*   POTASSIUM mmol/L 5.1 5.1 5.8*   CHLORIDE mmol/L 106 108* 100   CO2 mmol/L 22.9 23.6 25.4   BUN mg/dL 18 19 27*   CREATININE mg/dL 1.19 1.39* 1.58*   CALCIUM mg/dL 8.4* 8.4* 9.0   MAGNESIUM mg/dL  --  2.2  --    PHOSPHORUS mg/dL  --  4.3  --    Estimated Creatinine Clearance: 42.9 mL/min (by C-G formula based on Cr of 1.19).    Results from last 7 days  Lab Units 09/10/17  1756 09/09/17  0833   NITRITE UA  Negative Positive*   WBC UA /HPF 0-2 3-5*   BACTERIA UA /HPF None Seen None Seen   SQUAM EPITHEL UA /HPF 0-2 0-2   URINECX   --  No growth     Lab Results   Component Value Date    HGBA1C 5.22 09/10/2017     Glucose   Date/Time Value Ref Range Status   09/11/2017 1139 104 70 - 130 mg/dL Final   09/11/2017 0815 96 70 - 130 mg/dL Final   09/10/2017 2041 109 70 - 130 mg/dL Final   09/10/2017 1635 69 (L) 70 - 130 mg/dL Final   09/10/2017 1154 88 70 - 130 mg/dL Final   09/10/2017 0812 86 70 - 130 mg/dL Final   09/09/2017 1956 103 70 - 130 mg/dL Final   09/09/2017 1848 139 (H) 70 - 130 mg/dL Final         Assessment/Plan   Principal Problem:    RENZO (acute kidney injury)  Active Problems:    Urinary tract infection associated with indwelling urethral catheter    Disease of thyroid gland    COPD (chronic obstructive pulmonary disease)    Chronic respiratory failure    Dementia    Hyperkalemia    Hyponatremia    Psychosis    Urinary retention    DM II (diabetes mellitus, type II), controlled      PLAN  - off abx with negative culture  - not able to return to an independent setting will need care at home or SNF  - on flomax with plan for voiding trial prior to  DC  - a1c 5.2 can DC accuchecks and DM meds  - plts improving     Disposition  CCP working with family to determine LOC needed      Addy Pedraza MD  [facility]  09/11/17  3:42 PM

## 2017-09-11 NOTE — PROGRESS NOTES
LOS: 2 days   Patient Care Team:  No Known Provider as PCP - General    Chief Complaint:  Urinary retention    Subjective     Interval History:     Patient Complaints: confused  Patient Denies:  Other abnormalities  History taken from: patient    Review of Systems:    No fevers, chills, nausea    Objective     Vital Signs  Temp:  [97.5 °F (36.4 °C)-98.5 °F (36.9 °C)] 97.9 °F (36.6 °C)  Heart Rate:  [63-72] 70  Resp:  [16] 16  BP: (119-134)/(53-61) 132/53    Physical Exam:   Awake, alert, moses intact, urine clear.     Results Review:     I reviewed the patient's new clinical results.  CT abd pelvis: no stones, questionable splenule. No clear renal mass or hydronephrosis    Medication Review: on FLomax    Assessment/Plan     Principal Problem:    RENZO (acute kidney injury)  Active Problems:    Urinary tract infection associated with indwelling urethral catheter    Disease of thyroid gland    COPD (chronic obstructive pulmonary disease)    Chronic respiratory failure    Dementia    Hyperkalemia    Hyponatremia    Psychosis    Urinary retention    DM II (diabetes mellitus, type II), controlled      Urinary retention    Plan for disposition:  - continue Flomax  - will plan voiding trial prior to discharge    Rodriguez Mo Jr., MD  09/11/17  4:53 AM

## 2017-09-11 NOTE — PLAN OF CARE
Problem: Patient Care Overview (Adult)  Goal: Plan of Care Review  Outcome: Ongoing (interventions implemented as appropriate)    09/11/17 1359   Coping/Psychosocial Response Interventions   Plan Of Care Reviewed With patient;daughter   Patient Care Overview   Progress improving   Outcome Evaluation   Outcome Summary/Follow up Plan Pt. showing improvement in mental status and exhibits more cooperation. Appetite is good. Still feel pt is not able to live alone. Placement is being investigated. Will continue to monitor. 09/11/17         Problem: Fall Risk (Adult)  Goal: Absence of Falls  Outcome: Ongoing (interventions implemented as appropriate)    Problem: Infection, Risk/Actual (Adult)  Goal: Infection Prevention/Resolution  Outcome: Ongoing (interventions implemented as appropriate)    Problem: Confusion, Acute (Adult)  Goal: Cognitive/Functional Impairments Minimized  Outcome: Ongoing (interventions implemented as appropriate)  Goal: Safety  Outcome: Ongoing (interventions implemented as appropriate)

## 2017-09-11 NOTE — PLAN OF CARE
Problem: Patient Care Overview (Adult)  Goal: Plan of Care Review    09/11/17 0932   Coping/Psychosocial Response Interventions   Plan Of Care Reviewed With patient   Outcome Evaluation   Outcome Summary/Follow up Plan pt presents w, generalized deconditioning, baseline mobility, ambulated on unit 300ft SBA rwx no acute distress. encouraged to use rwx at home, would recommend home HHC PT with 24 hr assist. encourage cont'ed mobility acutely w/ staff, pt agreeable. will sign off.

## 2017-09-11 NOTE — PLAN OF CARE
Problem: Patient Care Overview (Adult)  Goal: Plan of Care Review  Outcome: Ongoing (interventions implemented as appropriate)    09/10/17 1703 09/10/17 2104 09/11/17 0334   Coping/Psychosocial Response Interventions   Plan Of Care Reviewed With --  patient;family --    Patient Care Overview   Progress no change --  --    Outcome Evaluation   Outcome Summary/Follow up Plan --  --  ac/hs, and w/ cond, ivf, moses, fall risk, PT to see, d/c plan uncertain       Goal: Adult Individualization and Mutuality  Outcome: Ongoing (interventions implemented as appropriate)  Goal: Discharge Needs Assessment  Outcome: Ongoing (interventions implemented as appropriate)    Problem: Fall Risk (Adult)  Goal: Identify Related Risk Factors and Signs and Symptoms  Outcome: Outcome(s) achieved Date Met:  09/11/17  Goal: Absence of Falls  Outcome: Ongoing (interventions implemented as appropriate)    Problem: Infection, Risk/Actual (Adult)  Goal: Identify Related Risk Factors and Signs and Symptoms  Outcome: Outcome(s) achieved Date Met:  09/11/17  Goal: Infection Prevention/Resolution  Outcome: Ongoing (interventions implemented as appropriate)    Problem: Confusion, Acute (Adult)  Goal: Identify Related Risk Factors and Signs and Symptoms  Outcome: Outcome(s) achieved Date Met:  09/11/17  Goal: Cognitive/Functional Impairments Minimized  Outcome: Ongoing (interventions implemented as appropriate)  Goal: Safety  Outcome: Ongoing (interventions implemented as appropriate)

## 2017-09-11 NOTE — THERAPY DISCHARGE NOTE
Acute Care - Physical Therapy Initial Eval/Discharge  [facility]     Patient Name: Juliano Garzon  : 1929  MRN: [id number]  Today's Date: 2017   Onset of Illness/Injury or Date of Surgery Date: 17  Date of Referral to PT: 17  Referring Physician: Alla      Admit Date: 2017    Visit Dx:    ICD-10-CM ICD-9-CM   1. Urinary tract infection associated with indwelling urethral catheter, initial encounter T83.511A 996.64    N39.0 599.0   2. Acute kidney injury N17.9 584.9   3. Hyperkalemia E87.5 276.7   4. Psychosis, unspecified psychosis type F29 298.9   5. Generalized weakness R53.1 780.79     Patient Active Problem List   Diagnosis   • Urinary tract infection associated with indwelling urethral catheter   • Disease of thyroid gland   • COPD (chronic obstructive pulmonary disease)   • Chronic respiratory failure   • Dementia   • RENZO (acute kidney injury)   • Hyperkalemia   • Hyponatremia   • Psychosis   • Urinary retention   • DM II (diabetes mellitus, type II), controlled     Past Medical History:   Diagnosis Date   • COPD (chronic obstructive pulmonary disease)    • Dementia    • Diabetes mellitus    • Disease of thyroid gland    • GERD (gastroesophageal reflux disease)      History reviewed. No pertinent surgical history.       PT ASSESSMENT (last 72 hours)      PT Evaluation       17 1138    Rehab Evaluation    Document Type evaluation;discharge summary  -     Subjective Information agree to therapy  -     Patient Effort, Rehab Treatment good  -LH     Symptoms Noted During/After Treatment none  -LH     General Information    Patient Profile Review yes  -LH     Onset of Illness/Injury or Date of Surgery Date 17  -     Referring Physician Alla  -     General Observations supine in bed no acute distress  -     Pertinent History Of Current Problem RENZO  -LH     Precautions/Limitations fall precautions  -     Prior Level of Function independent:   -     Equipment Currently Used at Home --   has rwx and WC doesnt use it  -     Plans/Goals Discussed With patient  -     Risks Reviewed patient:  -     Benefits Reviewed patient:  -     Living Environment    Lives With  alone  -DS    Living Arrangements  house  -DS    Home Accessibility  bath not on first floor;stairs (2 railings present)  -DS    Stair Railings at Home  inside, present at both sides  -DS    Type of Financial/Environmental Concern  none  -DS    Transportation Available  family or friend will provide  -DS    Living Environment Comment  Not appropriate for living alone currently. Family would like rehab placement.  -DS    Clinical Impression    Date of Referral to PT 09/11/17  -     Criteria for Skilled Therapeutic Interventions Met current level of function same as previous level of function  -     Pathology/Pathophysiology Noted (Describe Specifically for Each System) pulmonary  -     Pain Assessment    Pain Assessment No/denies pain  -     Vision Assessment/Intervention    Visual Impairment WNL  -     Cognitive Assessment/Intervention    Current Cognitive/Communication Assessment functional  -     Orientation Status oriented x 4  -     Follows Commands/Answers Questions 100% of the time  -     ROM (Range of Motion)    General ROM no range of motion deficits identified  -     MMT (Manual Muscle Testing)    General MMT Assessment no strength deficits identified  -     General MMT Assessment Detail BLEs grossly at least 3/5  -     Bed Mobility, Assessment/Treatment    Bed Mob, Supine to Sit, [place] contact guard assist  -     Bed Mob, Sit to Supine, [place] not tested  -     Transfer Assessment/Treatment    Transfers, Sit-Stand [place] stand by assist  -     Transfers, Stand-Sit [place] stand by assist  -     Transfers, Sit-Stand-Sit, Assist Device rolling walker  -     Gait Assessment/Treatment    Gait, [place] Level stand by assist   -     Gait, Assistive Device rolling walker  -     Gait, Distance (Feet) 300  -     Gait, Gait Pattern Analysis swing-through gait  -     Gait, Gait Deviations bilateral:;decreased heel strike  -     Gait, Safety Issues supplemental O2  -     Therapy Exercises    Bilateral Lower Extremities 10 reps;AROM:;sitting  -     Positioning and Restraints    Pre-Treatment Position in bed  -LH     Post Treatment Position chair  -     In Chair reclined;call light within reach;encouraged to call for assist;exit alarm on;notified ns  -       09/09/17 1131       General Information    Equipment Currently Used at Home shower chair;walker, rolling  -DS       User Key  (r) = Recorded By, (t) = Taken By, (c) = Cosigned By    Initials Name Provider Type     Viviane Monk, PT Physical Therapist    GOGO Deal RN Registered Nurse          Physical Therapy Education     Title: PT OT SLP Therapies (Resolved)     Topic: Physical Therapy (Resolved)     Point: Mobility training (Resolved)    Learning Progress Summary    Learner Readiness Method Response Comment Documented by Status   Patient Acceptance E,TB DU,[facility] 09/11/17 0931 Done               Point: Home exercise program (Resolved)    Learning Progress Summary    Learner Readiness Method Response Comment Documented by Status   Patient Acceptance E,TB DU,VU   09/11/17 0931 Done               Point: Body mechanics (Resolved)    Learning Progress Summary    Learner Readiness Method Response Comment Documented by Status   Patient Acceptance E,TB DU,[facility] 09/11/17 0931 Done               Point: Precautions (Resolved)    Learning Progress Summary    Learner Readiness Method Response Comment Documented by Status   Patient Acceptance E,TB DU,[facility] 09/11/17 0931 Done                      User Key     Initials Effective Dates Name Provider Type [facility] 02/07/17 -  Viviane Monk PT Physical Therapist PT                PT Recommendation and Plan  Anticipated  Discharge Disposition: home with home health, home with assist  PT Frequency: evaluation only  Plan of Care Review  Plan Of Care Reviewed With: patient  Outcome Summary/Follow up Plan: pt presents w, generalized deconditioning, baseline mobility, ambulated on unit 300ft SBA rwx no acute distress. encouraged to use rwx at home, would recommend home HHC PT with 24 hr assist. encourage cont'ed mobility acutely w/ staff, pt agreeable. will sign off.               Outcome Measures       09/11/17 0900          How much help from another person do you currently need...    Turning from your back to your side while in flat bed without using bedrails? 4  -LH      Moving from lying on back to sitting on the side of a flat bed without bedrails? 4  -LH      Moving to and from a bed to a chair (including a wheelchair)? 3  -LH      Standing up from a chair using your arms (e.g., wheelchair, bedside chair)? 3  -LH      Climbing 3-5 steps with a railing? 3  -LH      To walk in hospital room? 3  -LH      AM-PAC 6 Clicks Score 20  -      Functional Assessment    Outcome Measure Options AM-PAC 6 Clicks Basic Mobility (PT)  -        User Key  (r) = Recorded By, (t) = Taken By, (c) = Cosigned By    Initials Name Provider Type     Viviane Monk PT Physical Therapist           Time Calculation:         PT Charges       09/11/17 0934          Time Calculation    Start Time 0900  -      Stop Time 0915  -      Time Calculation (min) 15 min  -      PT Received On 09/11/17  -        User Key  (r) = Recorded By, (t) = Taken By, (c) = Cosigned By    Initials Name Provider Type     Viviane Monk PT Physical Therapist          Therapy Charges for Today     Code Description Service Date Service Provider Modifiers Qty    [id number]  PT EVAL MOD COMPLEXITY 2 9/11/2017 Viviane Monk PT GP 1          PT G-Codes  Outcome Measure Options: AM-PAC 6 Clicks Basic Mobility (PT)    PT Discharge Summary  Anticipated Discharge Disposition:  home with home health, home with assist    Viviane Monk, PT  9/11/2017

## 2017-09-11 NOTE — PROGRESS NOTES
Discharge Planning Assessment  [facility]     Patient Name: Juliano Garzon  MRN: [id number]  Today's Date: 9/11/2017    Admit Date: 9/9/2017          Discharge Needs Assessment       09/11/17 1150    Living Environment    Lives With alone    Living Arrangements house    Home Accessibility stairs within home;bath not on first floor    Number of Stairs Within Home 12    Type of Financial/Environmental Concern none    Transportation Available car;family or friend will provide    Living Environment    Provides Primary Care For no one    Primary Care Provided By child(shelbie) (specify)    Quality Of Family Relationships supportive;helpful;involved    Able to Return to Prior Living Arrangements yes    Discharge Needs Assessment    Concerns To Be Addressed discharge planning concerns    Equipment Currently Used at Home oxygen;bath bench;walker, rolling;commode    Discharge Planning Comments Undetermined            Discharge Plan       09/11/17 1154    Case Management/Social Work Plan    Plan Undetermined    Patient/Family In Agreement With Plan yes    Additional Comments IMM letter checked. CCP met with pt who gave permission for CCP to contact his daughter/emergency contact (Katie Benjamin, [phone]) to discuss d/c planning. CCP contacted pt's daughter. Facesheet verified. CCP role explained. Pt resides alone in a two level home with on flight interior steps to access basement and three interior steps to access bathroom. Pt reportedly uses rolling walker and continuous O2  (3L) supplied by [facility] in [place]. Pt has access to commode and bath bench at home. Pt reportedly current with [facility], and has had past sub-acute rehab at [facility] in [place], [place]. Pt's pharmacy is [facility] in [place], [place]. Per Shef[place], pt is current with [facility] . Pt's daughter states concerns about pt returning home, due to circumstances surrounding his admission and increased  "\"hallucinations\" related to dementia. Pt's daughter reportedly cares for all pt's home needs, including shopping, cooking, cleaning, and managing finances. Pt's daughter anticipates pt will require short-term rehab with likely transition to LTC either in a nursing home or memory care facility. Pt's daughter reports pt has some assets to private pay but will eventually be required to pursue Medicaid. [place] provided daughter with [facility] ([phone]) contact number for further information. Pt's daughter requests referral to [facility]. [facility] made referral made to [facility] and will f/u for eval.  Adriana Gonzalez LCSW        Discharge Placement     Facility/Agency Request Status Selected? Address Phone Number Fax Number    [facility] [address] [address] [phone]    [facility] Pending - Request Sent     [address] [facility] [address] [phone]                Demographic Summary       09/11/17 1147    Referral Information    Admission Type inpatient    Arrived From admitted as an inpatient    Referral Source nursing;physician    Reason For Consult discharge planning    Record Reviewed medical record    Primary Care Physician Information    Name Wally Valdez MD            Functional Status       09/11/17 1150    Functional Status Current    Ambulation 3-->assistive equipment and person    Transferring 2-->assistive person    Toileting 0-->independent    Bathing 0-->independent    Dressing 0-->independent    Eating 0-->independent    Communication 0-->understands/communicates without difficulty    Swallowing (if score 2 or more for any item, consult Rehab Services) 0-->swallows foods/liquids without difficulty    Functional Status Prior    Ambulation 1-->assistive equipment    Transferring 1-->assistive equipment    Toileting 1-->assistive equipment    " Bathing 1-->assistive equipment    Dressing 0-->independent    Eating 0-->independent    Communication 0-->understands/communicates without difficulty    Swallowing 0-->swallows foods/liquids without difficulty    Cognitive/Perceptual/Developmental    Current Mental Status/Cognitive Functioning recall memory is not intact;unable to assess            Psychosocial     None            Abuse/Neglect     None            Legal     None            Substance Abuse     None            Patient Forms     None          Silva Gonzalez, JONATHAN

## 2017-09-12 ENCOUNTER — APPOINTMENT (OUTPATIENT)
Dept: NEUROLOGY | Facility: HOSPITAL | Age: 82
End: 2017-09-12
Attending: PSYCHIATRY & NEUROLOGY

## 2017-09-12 LAB
GLUCOSE BLDC GLUCOMTR-MCNC: 113 MG/DL (ref 70–130)
GLUCOSE BLDC GLUCOMTR-MCNC: 132 MG/DL (ref 70–130)
GLUCOSE BLDC GLUCOMTR-MCNC: 141 MG/DL (ref 70–130)

## 2017-09-12 PROCEDURE — 95816 EEG AWAKE AND DROWSY: CPT | Performed by: PSYCHIATRY & NEUROLOGY

## 2017-09-12 PROCEDURE — 94799 UNLISTED PULMONARY SVC/PX: CPT

## 2017-09-12 PROCEDURE — 25010000002 ENOXAPARIN PER 10 MG: Performed by: HOSPITALIST

## 2017-09-12 PROCEDURE — 82962 GLUCOSE BLOOD TEST: CPT

## 2017-09-12 PROCEDURE — 95816 EEG AWAKE AND DROWSY: CPT

## 2017-09-12 PROCEDURE — 99231 SBSQ HOSP IP/OBS SF/LOW 25: CPT | Performed by: PSYCHIATRY & NEUROLOGY

## 2017-09-12 RX ADMIN — CHOLECALCIFEROL TAB 10 MCG (400 UNIT) 400 UNITS: 10 TAB at 09:10

## 2017-09-12 RX ADMIN — HYDROXYZINE HYDROCHLORIDE 25 MG: 25 TABLET, FILM COATED ORAL at 09:09

## 2017-09-12 RX ADMIN — ASPIRIN 325 MG: 325 TABLET ORAL at 09:09

## 2017-09-12 RX ADMIN — TAMSULOSIN HYDROCHLORIDE 0.4 MG: 0.4 CAPSULE ORAL at 21:31

## 2017-09-12 RX ADMIN — SERTRALINE 50 MG: 50 TABLET, FILM COATED ORAL at 09:10

## 2017-09-12 RX ADMIN — MEMANTINE HYDROCHLORIDE 10 MG: 10 TABLET, FILM COATED ORAL at 09:10

## 2017-09-12 RX ADMIN — DEXTROSE AND SODIUM CHLORIDE 50 ML/HR: 5; 450 INJECTION, SOLUTION INTRAVENOUS at 06:01

## 2017-09-12 RX ADMIN — ENOXAPARIN SODIUM 30 MG: 30 INJECTION SUBCUTANEOUS at 09:09

## 2017-09-12 RX ADMIN — BUDESONIDE AND FORMOTEROL FUMARATE DIHYDRATE 2 PUFF: 80; 4.5 AEROSOL RESPIRATORY (INHALATION) at 06:48

## 2017-09-12 RX ADMIN — RISPERIDONE 1 MG: 1 TABLET, FILM COATED ORAL at 09:09

## 2017-09-12 RX ADMIN — PANTOPRAZOLE SODIUM 40 MG: 40 TABLET, DELAYED RELEASE ORAL at 09:09

## 2017-09-12 RX ADMIN — HYDROXYZINE HYDROCHLORIDE 25 MG: 25 TABLET, FILM COATED ORAL at 18:10

## 2017-09-12 RX ADMIN — MONTELUKAST 10 MG: 10 TABLET, FILM COATED ORAL at 21:31

## 2017-09-12 RX ADMIN — LEVOTHYROXINE SODIUM 25 MCG: 25 TABLET ORAL at 09:09

## 2017-09-12 RX ADMIN — RISPERIDONE 1 MG: 1 TABLET, FILM COATED ORAL at 18:11

## 2017-09-12 NOTE — PLAN OF CARE
Problem: Patient Care Overview (Adult)  Goal: Plan of Care Review  Outcome: Ongoing (interventions implemented as appropriate)    09/12/17 1041   Coping/Psychosocial Response Interventions   Plan Of Care Reviewed With patient   Patient Care Overview   Progress improving   Outcome Evaluation   Outcome Summary/Follow up Plan pt resting IV fluids D/C today. daughter called this am wanted to talk to CCP message given pt to discharge to [facility] waiting on approval         Problem: Fall Risk (Adult)  Goal: Absence of Falls  Outcome: Ongoing (interventions implemented as appropriate)    Problem: Infection, Risk/Actual (Adult)  Goal: Infection Prevention/Resolution  Outcome: Ongoing (interventions implemented as appropriate)    Problem: Confusion, Acute (Adult)  Goal: Cognitive/Functional Impairments Minimized  Outcome: Ongoing (interventions implemented as appropriate)  Goal: Safety  Outcome: Ongoing (interventions implemented as appropriate)

## 2017-09-12 NOTE — PROGRESS NOTES
Continued Stay Note  [facility]     Patient Name: Juliano Garzon  MRN: [id number]  Today's Date: 9/12/2017    Admit Date: 9/9/2017          Discharge Plan       09/12/17 1615    Case Management/Social Work Plan    Plan Undetermined    Patient/Family In Agreement With Plan yes    Additional Comments Per [facility] and [facility] decline because they do not have locked memory units. CCP spoke with pt's daughter who is agreeable to referrals to facilities with memory units in [place], and also requests referrals to facilities closer to pt's home in [place], [place]. CCP left M for and awaiting return contact from [facility] (ph:[phone]) to make referral. CCP faxed referral to [facility] ([place], fax: [phone]). CCP made referrals to [facility] ([place]) and [facility] ([place]). CCP to follow for facility evals. Adriana Gonzalez LCSW              Discharge Codes     None        Expected Discharge Date and Time     Expected Discharge Date Expected Discharge Time    Sep 12, 2017             Silva Gonzalez LCSW

## 2017-09-12 NOTE — PLAN OF CARE
Problem: Patient Care Overview (Adult)  Goal: Plan of Care Review  Outcome: Ongoing (interventions implemented as appropriate)    09/12/17 0540   Coping/Psychosocial Response Interventions   Plan Of Care Reviewed With patient   Patient Care Overview   Progress improving   Outcome Evaluation   Outcome Summary/Follow up Plan Patient slept well tonight. Patient remains on 2L NC and has no complaints. Patient eating well, and enjoys puzzles in his word puzzle book. Plan is for case management to look for placement at SNF. Vital signs remain stable. Continue to monitor closely, continue with current plan of care         Problem: Fall Risk (Adult)  Goal: Absence of Falls  Outcome: Ongoing (interventions implemented as appropriate)    Problem: Infection, Risk/Actual (Adult)  Goal: Infection Prevention/Resolution  Outcome: Ongoing (interventions implemented as appropriate)    Problem: Confusion, Acute (Adult)  Goal: Cognitive/Functional Impairments Minimized  Outcome: Ongoing (interventions implemented as appropriate)  Goal: Safety  Outcome: Ongoing (interventions implemented as appropriate)

## 2017-09-12 NOTE — PROGRESS NOTES
Continued Stay Note  [facility]     Patient Name: Juliano Garzon  MRN: [id number]  Today's Date: 9/12/2017    Admit Date: 9/9/2017          Discharge Plan       09/12/17 1639    Case Management/Social Work Plan    Additional Comments Daughter asking about possible transfer to [facility].  Discussed with Dr Pedraza and amna does not have any acute behaviors that warrant an inpatient psych admission.       09/12/17 1615    Case Management/Social Work Plan    Plan Undetermined    Patient/Family In Agreement With Plan yes    Additional Comments Per [facility], [facility] and [facility] decline because they do not have locked memory units. CCP spoke with pt's daughter who is agreeable to referrals to facilities with memory units in [place], and also requests referrals to facilities closer to pt's home in [place], [place]. CCP left M for and awaiting return contact from [facility] and [facility] (ph:[phone]) to make referral. CCP faxed referral to [facility] ([place], fax: [phone]). CCP made referrals to [facility] ([place]) and [facility] ([place]). CCP to follow for facility evals. Adriana Gonzalez LCSW              Discharge Codes     None        Expected Discharge Date and Time     Expected Discharge Date Expected Discharge Time    Sep 12, 2017             Luisana Kirk RN

## 2017-09-13 LAB
ETHANOL UR-MCNC: NEGATIVE %
GLUCOSE BLDC GLUCOMTR-MCNC: 139 MG/DL (ref 70–130)

## 2017-09-13 PROCEDURE — 25010000002 ENOXAPARIN PER 10 MG: Performed by: HOSPITALIST

## 2017-09-13 PROCEDURE — 94799 UNLISTED PULMONARY SVC/PX: CPT

## 2017-09-13 PROCEDURE — 82962 GLUCOSE BLOOD TEST: CPT

## 2017-09-13 RX ADMIN — HYDROXYZINE HYDROCHLORIDE 25 MG: 25 TABLET, FILM COATED ORAL at 17:48

## 2017-09-13 RX ADMIN — LEVOTHYROXINE SODIUM 25 MCG: 25 TABLET ORAL at 08:32

## 2017-09-13 RX ADMIN — CHOLECALCIFEROL TAB 10 MCG (400 UNIT) 400 UNITS: 10 TAB at 08:32

## 2017-09-13 RX ADMIN — PANTOPRAZOLE SODIUM 40 MG: 40 TABLET, DELAYED RELEASE ORAL at 08:32

## 2017-09-13 RX ADMIN — BUDESONIDE AND FORMOTEROL FUMARATE DIHYDRATE 2 PUFF: 80; 4.5 AEROSOL RESPIRATORY (INHALATION) at 22:10

## 2017-09-13 RX ADMIN — RISPERIDONE 1 MG: 1 TABLET, FILM COATED ORAL at 17:48

## 2017-09-13 RX ADMIN — ASPIRIN 325 MG: 325 TABLET ORAL at 08:32

## 2017-09-13 RX ADMIN — BUDESONIDE AND FORMOTEROL FUMARATE DIHYDRATE 2 PUFF: 80; 4.5 AEROSOL RESPIRATORY (INHALATION) at 10:54

## 2017-09-13 RX ADMIN — SERTRALINE 50 MG: 50 TABLET, FILM COATED ORAL at 08:32

## 2017-09-13 RX ADMIN — TAMSULOSIN HYDROCHLORIDE 0.4 MG: 0.4 CAPSULE ORAL at 22:06

## 2017-09-13 RX ADMIN — HYDROXYZINE HYDROCHLORIDE 25 MG: 25 TABLET, FILM COATED ORAL at 08:32

## 2017-09-13 RX ADMIN — MONTELUKAST 10 MG: 10 TABLET, FILM COATED ORAL at 22:06

## 2017-09-13 RX ADMIN — RISPERIDONE 1 MG: 1 TABLET, FILM COATED ORAL at 08:33

## 2017-09-13 RX ADMIN — ENOXAPARIN SODIUM 30 MG: 30 INJECTION SUBCUTANEOUS at 08:33

## 2017-09-13 RX ADMIN — MEMANTINE HYDROCHLORIDE 10 MG: 10 TABLET, FILM COATED ORAL at 08:33

## 2017-09-13 NOTE — PROGRESS NOTES
Continued Stay Note  [facility]     Patient Name: Juliano Garzon  MRN: [id number]  Today's Date: 9/13/2017    Admit Date: 9/9/2017          Discharge Plan       09/13/17 1143    Case Management/Social Work Plan    Additional Comments Per Vince Cohn' locked unit is full and pt has been placed on waitlist. [facility] declines. Per Ray Enciso continues to eval pt. CCP to follow. Adriana Gonzalez LCSW              Discharge Codes     None        Expected Discharge Date and Time     Expected Discharge Date Expected Discharge Time    Sep 12, 2017             Silva Gonzalez LCSW

## 2017-09-13 NOTE — PROGRESS NOTES
Continued Stay Note  [facility]     Patient Name: Juliano Garzon  MRN: [id number]  Today's Date: 9/13/2017    Admit Date: 9/9/2017          Discharge Plan       09/13/17 1549    Case Management/Social Work Plan    Additional Comments Per [facility] ([facility]) and [facility] ([facility]), pt accepted clinically with financial evaluation pending. CCP spoke with pt's daughter (Katie Benjamin, [phone]) re: facility selections for d/c. Pt's daughter states that she would like for pt to [facility]/c to [facility] (private pay) for a one-month trial period while she evaluates finances and pt's options for long-term care in the future. CCP spoke with [facility] ([place], [phone]) who states facility will eval pt at [facility] this evening. CCP to follow for [facility] eval. Adriana Gonzalez LCSW                  Discharge Codes     None        Expected Discharge Date and Time     Expected Discharge Date Expected Discharge Time    Sep 12, 2017             Silva Gonzalez LCSW

## 2017-09-13 NOTE — PROGRESS NOTES
LOS: 4 days     Name: Juliano Garzon  Age/Sex: 87 y.o. male  :  1929        PCP: Renzo Valdez MD    Subjective   Feeling better no further hallucinations or confusion per nursing.  Does not remain oriented throughout the day needs redirection.  Enjoys word puzzles. Resting comfortably today  General: No Fever or Chills, Cardiac: No Chest Pain or Palpitations, Resp: No Cough or SOA, GI: No Nausea, Vomiting, or Diarrhea and Other: No bleeding      aspirin 325 mg Oral Daily   budesonide-formoterol 2 puff Inhalation BID - RT   cholecalciferol 400 Units Oral Daily   enoxaparin 30 mg Subcutaneous Daily   hydrOXYzine 25 mg Oral BID   levothyroxine 25 mcg Oral Daily   memantine 10 mg Oral Daily   montelukast 10 mg Oral Nightly   pantoprazole 40 mg Oral Daily   risperiDONE 1 mg Oral BID   sertraline 50 mg Oral Daily   tamsulosin 0.4 mg Oral Nightly          Objective   Vital Signs  Temp:  [96.9 °F (36.1 °C)-98 °F (36.7 °C)] 97.2 °F (36.2 °C)  Heart Rate:  [65-79] 66  Resp:  [12-18] 16  BP: (109-128)/(58-76) 128/76  Body mass index is 23.95 kg/(m^2).    Intake/Output Summary (Last 24 hours) at 17 0803  Last data filed at 17 0100   Gross per 24 hour   Intake                0 ml   Output             1700 ml   Net            -1700 ml       Physical Exam   Constitutional: He appears well-developed and well-nourished.   HENT:   Head: Normocephalic and atraumatic.   Eyes: EOM are normal.   Neck: Neck supple. No JVD present.   Cardiovascular: Normal rate, regular rhythm and normal heart sounds.    Pulmonary/Chest: Effort normal and breath sounds normal.   Abdominal: Soft. Bowel sounds are normal. He exhibits no distension.   Musculoskeletal: Normal range of motion. He exhibits no edema.   Neurological: He is alert.   Oriented to person and place   Skin: Skin is warm and dry.   Psychiatric: He has a normal mood and affect. His behavior is normal.   Nursing note and vitals reviewed.        Results  Review:       I reviewed the patient's new clinical results.    Results from last 7 days  Lab Units 09/10/17  0759 09/09/17  0800   WBC 10*3/mm3 5.27 8.32   HEMOGLOBIN g/dL 10.5* 11.9*   PLATELETS 10*3/mm3 133* 81*       Results from last 7 days  Lab Units 09/11/17  0554 09/10/17  0759 09/09/17  0800   SODIUM mmol/L 139 142 135*   POTASSIUM mmol/L 5.1 5.1 5.8*   CHLORIDE mmol/L 106 108* 100   CO2 mmol/L 22.9 23.6 25.4   BUN mg/dL 18 19 27*   CREATININE mg/dL 1.19 1.39* 1.58*   CALCIUM mg/dL 8.4* 8.4* 9.0   MAGNESIUM mg/dL  --  2.2  --    PHOSPHORUS mg/dL  --  4.3  --    Estimated Creatinine Clearance: 42.9 mL/min (by C-G formula based on Cr of 1.19).    Results from last 7 days  Lab Units 09/10/17  1756 09/09/17  0833   NITRITE UA  Negative Positive*   WBC UA /HPF 0-2 3-5*   BACTERIA UA /HPF None Seen None Seen   SQUAM EPITHEL UA /HPF 0-2 0-2   URINECX   --  No growth     Lab Results   Component Value Date    HGBA1C 5.22 09/10/2017     Glucose   Date/Time Value Ref Range Status   09/12/2017 1639 132 (H) 70 - 130 mg/dL Final   09/12/2017 1125 113 70 - 130 mg/dL Final   09/12/2017 0811 141 (H) 70 - 130 mg/dL Final   09/11/2017 2125 146 (H) 70 - 130 mg/dL Final   09/11/2017 1640 122 70 - 130 mg/dL Final   09/11/2017 1139 104 70 - 130 mg/dL Final   09/11/2017 0815 96 70 - 130 mg/dL Final   09/10/2017 2041 109 70 - 130 mg/dL Final         Assessment/Plan   Principal Problem:    RENZO (acute kidney injury)  Active Problems:    Urinary tract infection associated with indwelling urethral catheter    Disease of thyroid gland    COPD (chronic obstructive pulmonary disease)    Chronic respiratory failure    Dementia    Hyperkalemia    Hyponatremia    Psychosis    Urinary retention    DM II (diabetes mellitus, type II), controlled      PLAN  - off abx with negative culture  - not able to return to an independent setting will need care at home or SNF  - on flomax with plan for voiding trial prior to DC hopefully tomorrow  - a1c  5.2 can DC accuchecks and DM meds  - plts improving     Disposition  Hopefully able to transition to facility soon      Addy Pedraza MD  [facility]  09/13/17  8:03 AM

## 2017-09-13 NOTE — PROGRESS NOTES
Neurology follow-up note    CC: Dementia    S: No new symptoms.  The patient states he vaguely recalls me but not my name.  O: Vitals: 124/70-79-12; 97.0.  Oxygen saturation 90%    Exam: The patient has some improvement in orientation.  He knows where he is from which is [facility] and he did state he was in [facility].  He had to look up the location of [facility].  He knew that it was 2017 and September but did not know the day..  There is no focal visual changes facial asymmetry or drift or unusually use of either upper extremity.    EEG shows mild diffuse slowing at 7 Hz.  No epileptiform activities.    A/P: Dementia needing placement.  Options remain the same.  No further neurologic evaluation needed at this time.  Please call for questions.

## 2017-09-13 NOTE — PROGRESS NOTES
Continued Stay Note  [facility]     Patient Name: Juliano Garzon  MRN: [id number]  Today's Date: 9/13/2017    Admit Date: 9/9/2017          Discharge Plan       09/13/17 1336    Case Management/Social Work Plan    Additional Comments Per Ray Enciso evaluation continues pending financial review. Per Marialuisa Perez evaluating pt. [facility] denies pt. CCP contacted pt's daughter who states plan to complete financial eval. CCP to follow up with daughter re: placement determinations this afternoon. Adriana Gonzalez LCSW                 Discharge Codes     None        Expected Discharge Date and Time     Expected Discharge Date Expected Discharge Time    Sep 12, 2017             Silva Gonzalez LCSW

## 2017-09-13 NOTE — PLAN OF CARE
Problem: Patient Care Overview (Adult)  Goal: Plan of Care Review  Outcome: Ongoing (interventions implemented as appropriate)    09/13/17 1721   Coping/Psychosocial Response Interventions   Plan Of Care Reviewed With patient;daughter   Patient Care Overview   Progress improving   Outcome Evaluation   Outcome Summary/Follow up Plan Pt continues to remain calm and no behavior disturbances during shift. Planning to go to [facility] in [facility] tomorrow, TALON irving came to evaluate pt today. No complaints of pain, VSS will continue to monitor.        Goal: Adult Individualization and Mutuality  Outcome: Ongoing (interventions implemented as appropriate)    09/13/17 1721   Individualization   Patient Specific Preferences goes by lou/merrick   Patient Specific Goals go home   Patient Specific Interventions plan to go to [facility] tomorrow in [facility]         Problem: Fall Risk (Adult)  Goal: Absence of Falls  Outcome: Ongoing (interventions implemented as appropriate)    Problem: Infection, Risk/Actual (Adult)  Goal: Infection Prevention/Resolution  Outcome: Ongoing (interventions implemented as appropriate)    Problem: Confusion, Acute (Adult)  Goal: Cognitive/Functional Impairments Minimized  Outcome: Ongoing (interventions implemented as appropriate)  Goal: Safety  Outcome: Ongoing (interventions implemented as appropriate)    Problem: Skin Integrity Impairment, Risk/Actual (Adult)  Goal: Identify Related Risk Factors and Signs and Symptoms  Outcome: Ongoing (interventions implemented as appropriate)  Goal: Skin Integrity/Wound Healing  Outcome: Ongoing (interventions implemented as appropriate)

## 2017-09-13 NOTE — PLAN OF CARE
Problem: Patient Care Overview (Adult)  Goal: Plan of Care Review  Outcome: Ongoing (interventions implemented as appropriate)    09/13/17 0309   Coping/Psychosocial Response Interventions   Plan Of Care Reviewed With patient   Outcome Evaluation   Outcome Summary/Follow up Plan Pt VSS, is on 2L NC and no c/o pain or nausea. Pt is alert and oriented but unsure of exact date, confused and forgetful but pleasant. Pt has recent hx of falls. Will continue to monitor pt closely and follow MD orders.          Problem: Fall Risk (Adult)  Goal: Absence of Falls  Outcome: Ongoing (interventions implemented as appropriate)    09/13/17 0309   Fall Risk (Adult)   Absence of Falls making progress toward outcome         Problem: Infection, Risk/Actual (Adult)  Goal: Infection Prevention/Resolution  Outcome: Ongoing (interventions implemented as appropriate)    09/13/17 0309   Infection, Risk/Actual (Adult)   Infection Prevention/Resolution making progress toward outcome         Problem: Confusion, Acute (Adult)  Goal: Cognitive/Functional Impairments Minimized  Outcome: Ongoing (interventions implemented as appropriate)    09/13/17 0309   Confusion, Acute (Adult)   Cognitive/Functional Impairments Minimized making progress toward outcome       Goal: Safety  Outcome: Ongoing (interventions implemented as appropriate)    09/13/17 0309   Confusion, Acute (Adult)   Safety making progress toward outcome

## 2017-09-13 NOTE — PROGRESS NOTES
Continued Stay Note  [facility]     Patient Name: Juliano Garzon  MRN: [id number]  Today's Date: 9/13/2017    Admit Date: 9/9/2017          Discharge Plan       09/13/17 1744    Case Management/Social Work Plan    Plan [facility] via family transport    Patient/Family In Agreement With Plan yes    Additional Comments Per Laurie [facility] ([place] [phone]), pt approved and a bed available tomorrow. Facility requests d/c packet (d/c summary, avs, prescriptions) to accompany family, but does not require report or fax. [facility] spoke with pt's daughter (Katie Benjamin, [phone]) who selects [facility] and states she will provide pt transport to facility upon d/c, anticipated tomorrow. Adriana Gonzalez LCSW           Discharge Codes     None        Expected Discharge Date and Time     Expected Discharge Date Expected Discharge Time    Sep 12, 2017             Silva Gonzalez LCSW

## 2017-09-14 ENCOUNTER — HOSPITAL ENCOUNTER (INPATIENT)
Facility: HOSPITAL | Age: 82
LOS: 7 days | Discharge: HOME | End: 2017-09-21
Attending: SPECIALIST | Admitting: SPECIALIST

## 2017-09-14 VITALS
BODY MASS INDEX: 24 KG/M2 | HEART RATE: 63 BPM | TEMPERATURE: 97.3 F | OXYGEN SATURATION: 97 % | RESPIRATION RATE: 16 BRPM | HEIGHT: 67 IN | DIASTOLIC BLOOD PRESSURE: 61 MMHG | SYSTOLIC BLOOD PRESSURE: 132 MMHG | WEIGHT: 152.9 LBS

## 2017-09-14 DIAGNOSIS — Z74.09 IMPAIRED FUNCTIONAL MOBILITY, BALANCE, GAIT, AND ENDURANCE: Primary | ICD-10-CM

## 2017-09-14 PROBLEM — F03.918 DEMENTIA WITH BEHAVIORAL DISTURBANCE (HCC): Status: ACTIVE | Noted: 2017-09-14

## 2017-09-14 LAB
GLUCOSE BLDC GLUCOMTR-MCNC: 106 MG/DL (ref 70–130)
GLUCOSE BLDC GLUCOMTR-MCNC: 113 MG/DL (ref 70–130)
METHYLMALONATE SERPL-SCNC: 512 NMOL/L (ref 0–378)

## 2017-09-14 PROCEDURE — 90791 PSYCH DIAGNOSTIC EVALUATION: CPT

## 2017-09-14 PROCEDURE — 94799 UNLISTED PULMONARY SVC/PX: CPT

## 2017-09-14 PROCEDURE — 82962 GLUCOSE BLOOD TEST: CPT

## 2017-09-14 PROCEDURE — 25010000002 ENOXAPARIN PER 10 MG: Performed by: HOSPITALIST

## 2017-09-14 RX ORDER — TAMSULOSIN HYDROCHLORIDE 0.4 MG/1
0.4 CAPSULE ORAL NIGHTLY
Status: DISPENSED | OUTPATIENT
Start: 2017-09-14 | End: 2017-09-15

## 2017-09-14 RX ORDER — HYDROXYZINE HYDROCHLORIDE 25 MG/1
25 TABLET, FILM COATED ORAL 2 TIMES DAILY
Status: COMPLETED | OUTPATIENT
Start: 2017-09-14 | End: 2017-09-14

## 2017-09-14 RX ORDER — ACETAMINOPHEN 325 MG/1
650 TABLET ORAL EVERY 4 HOURS PRN
Status: DISCONTINUED | OUTPATIENT
Start: 2017-09-14 | End: 2017-09-21 | Stop reason: HOSPADM

## 2017-09-14 RX ORDER — MONTELUKAST SODIUM 10 MG/1
10 TABLET ORAL NIGHTLY
Status: DISPENSED | OUTPATIENT
Start: 2017-09-14 | End: 2017-09-15

## 2017-09-14 RX ORDER — ALUMINA, MAGNESIA, AND SIMETHICONE 2400; 2400; 240 MG/30ML; MG/30ML; MG/30ML
15 SUSPENSION ORAL EVERY 6 HOURS PRN
Status: DISCONTINUED | OUTPATIENT
Start: 2017-09-14 | End: 2017-09-21 | Stop reason: HOSPADM

## 2017-09-14 RX ORDER — BUDESONIDE AND FORMOTEROL FUMARATE DIHYDRATE 80; 4.5 UG/1; UG/1
2 AEROSOL RESPIRATORY (INHALATION)
Status: DISPENSED | OUTPATIENT
Start: 2017-09-14 | End: 2017-09-15

## 2017-09-14 RX ADMIN — HYDROXYZINE HYDROCHLORIDE 25 MG: 25 TABLET, FILM COATED ORAL at 17:57

## 2017-09-14 RX ADMIN — CHOLECALCIFEROL TAB 10 MCG (400 UNIT) 400 UNITS: 10 TAB at 09:20

## 2017-09-14 RX ADMIN — BUDESONIDE AND FORMOTEROL FUMARATE DIHYDRATE 2 PUFF: 80; 4.5 AEROSOL RESPIRATORY (INHALATION) at 07:15

## 2017-09-14 RX ADMIN — SERTRALINE 50 MG: 50 TABLET, FILM COATED ORAL at 09:20

## 2017-09-14 RX ADMIN — PANTOPRAZOLE SODIUM 40 MG: 40 TABLET, DELAYED RELEASE ORAL at 09:19

## 2017-09-14 RX ADMIN — HYDROXYZINE HYDROCHLORIDE 25 MG: 25 TABLET, FILM COATED ORAL at 09:20

## 2017-09-14 RX ADMIN — LEVOTHYROXINE SODIUM 25 MCG: 25 TABLET ORAL at 09:20

## 2017-09-14 RX ADMIN — ASPIRIN 325 MG: 325 TABLET ORAL at 09:20

## 2017-09-14 RX ADMIN — RISPERIDONE 1 MG: 1 TABLET, FILM COATED ORAL at 09:20

## 2017-09-14 RX ADMIN — MEMANTINE HYDROCHLORIDE 10 MG: 10 TABLET, FILM COATED ORAL at 09:20

## 2017-09-14 RX ADMIN — ENOXAPARIN SODIUM 30 MG: 30 INJECTION SUBCUTANEOUS at 09:20

## 2017-09-14 NOTE — CONSULTS
"IDENTIFYING INFORMATION: The patient is an 87-year-old white male with a history of dementia and depression seen related to threats of suicide made to his granddaughter    CHIEF COMPLAINT:  None given    INFORMANT:  Patient, family and chart    RELIABILITY:  Good    HISTORY OF PRESENT ILLNESS: Patient is an 87-year-old  white male who is admitted on 9/9/2017 with a urinary tract infection and complications of COPD.  Patient seen by psychiatry given recent suicidal threats.  Patient had reportedly stated to his granddaughter that he plan to \"shoot himself or cut his throat\" if forced to go to an assisted living facility.  The patient wishes to return home.  The patient has been diagnosed with dementia and is on Namenda for this condition.  He is also been started on Zoloft though it is unclear when this medication was initiated.  The patient is also experiencing some visual hallucinations while hospitalized and has been started on Risperdal at a low dose of 1 mg twice daily.  When seen today the patient denies suicidal ideation and denies having made suicidal threats.  He angrily demands to go home during today's interview.  He denies recent changes in sleep or appetite.    PAST PSYCHIATRIC HISTORY: As above    PAST MEDICAL HISTORY:  The patient suffers from COPD hypothyroidism GERD and benign prostatic hypertrophy    MEDICATIONS: Aspirin, Symbicort, vitamin D3, Lovenox, Atarax, Synthroid, Namenda, Singulair, Protonix, Risperdal, Zoloft, Flomax    ALLERGIES:  None    FAMILY HISTORY:  Noncontributory    SOCIAL HISTORY: Patient has been living at home alone.  He is .  There is no reported use at this time of alcohol tobacco or street drugs.    MENTAL STATUS EXAM: The patient is an elderly white male with a nasal cannula in place.  He is dressed in hospital garb.  The patient is awake and alert.  He is oriented to person and place and can identify the [place] Pres.  He cannot identify the date but " knows the years 2017.  His mood is somewhat irritable his affect constricted.  Speech is occasionally confabulatory but generally relevant coherent.  Formal testing of memory reveals recall of 0 of 3 objects after 5 minutes, the patient's forward digit span is 4 his reversed digit span 0.  He is unable to complete serial sevens.  He is of capacity for abstract thought appears to be significant impaired.  His judgment and insight all social impairment.  The patient is currently denying suicidal or homicidal ideation and denies any psychotic symptoms.    ASSETS/LIABILITIES: Supportive family/advancing health issues    DIAGNOSTIC IMPRESSION: Primary dementia Alzheimer's type with behavioral disturbance, major depressive disorder recurrent moderate, multiple medical problems described previously    PLAN:  At this point patient's family continues to express grave concerns regarding his safety outside the hospital with regards not only to his recent suicidal threats but also some psychotic symptoms which of recently been occurring, specifically some visual hallucinations.  We will look to admit the patient to the crisis management unit once he is medically stabilized.  In the meantime I would recommend a sitter and suicide precautions.    Thank you very much for the opportunity to see this patient.

## 2017-09-14 NOTE — CONSULTS
"88 yo white male evaluated in P397. Patient made suicidal comment to granddaughter Sabrina Jones twice on 17. Patient stated that he \"just as soon cut his throat with a knife and shoot himself\". Patient probable discharge to [facility] today. Patient has history of dementia and psychosis. No current psychosis or overt confusion. Affect flat, blunted. Poor eye contact. Patient does not deny making remarks but does seem to minimize his comments. Patient states he is frustrated with moving around so much. Patient admits to feeling depressed. Asked if he would like to speak to a psychiatrist and patient states \"no, it won't help\". Patient states his wife of 65 years of marriage  2.5 years ago. States he has 3 sons and 1 daughter. Denies previous psychiatric history or history of self harm.     Spoke with patient's RN Watson bowser plan of care. Order put in for psychiatrist to see.  "

## 2017-09-14 NOTE — NURSING NOTE
Per infection control: The patient may leave his room. Prior to leaving the patient needs to perform hand hygiene. The moses bag should be emptied prior to leaving the room.

## 2017-09-14 NOTE — PROGRESS NOTES
"Cc retention  Doing well  For dc today  On flomax.     LOS: 5 days   Patient Care Team:  Renzo Valdez MD as PCP - General (Internal Medicine)        Subjective     History of Present Illness    Subjective      Objective     Vital Signs  Temp:  [97.2 °F (36.2 °C)-98.2 °F (36.8 °C)] 97.4 °F (36.3 °C)  Heart Rate:  [64-74] 66  Resp:  [16] 16  BP: ()/(54-76) 141/65    Objective:  General Appearance:  Comfortable.    Vital signs: (most recent): Blood pressure 141/65, pulse 66, temperature 97.4 °F (36.3 °C), temperature source Oral, resp. rate 16, height 67\" (170.2 cm), weight 152 lb 14.4 oz (69.4 kg), SpO2 97 %.  Vital signs are normal.    Output: Producing urine.    HEENT: Normal HEENT exam.    Lungs:  Normal respiratory rate and normal effort.    Abdomen: Abdomen is soft.              Results Review:  New clinical results reviewed.        Assessment/Plan     Principal Problem:    RENZO (acute kidney injury)  Active Problems:    Urinary tract infection associated with indwelling urethral catheter    Disease of thyroid gland    COPD (chronic obstructive pulmonary disease)    Chronic respiratory failure    Dementia    Hyperkalemia    Hyponatremia    Psychosis    Urinary retention    DM II (diabetes mellitus, type II), controlled      Assessment:  (Retention   Will dc moses.  Cont flomax after dc.  possible renal mass   Will eval as op).       Carroll Terry MD  09/14/17  6:44 AM            "

## 2017-09-14 NOTE — NURSING NOTE
"Dr Vera saw pt today, states admit to [facility] when medically cleared.      Spoke with pt in room P397.  Pt is [place].  He introduced me to his dtr, Katie and Sancho (dtr in law) in the room.  He states he has no POA but his dtr states she is POA, has presented papers to the hospital.  He indicates that his dtr is \"tired of me, wants to get rid of me.\"  He does refer to \"find men in my car and they brought a bunch of women with them.\"  The men in car issues has been part of his visual hallucination per dtr.  He did have a fall PTA and has some scabbed areas on his legs.  No other skin issue.  He is on voiding trial, moses removed at approx 8:30am today.      Katie, dtr/POA, states pt is fine during the day but has been having visual hallucinations since a few days after his wife  3 yr ago.  The visual hallucinations happen in the evening and pt will wander around the house at night d/t his hallucinations.  He wandered out of the home for 1st time PTA and this was due to \"seeing the men foaming at the mouth\"(per dtr's info.)  On night of admission pt had cut his moses tube in half.  Dtr is being told different info by different medical providers about whether pt has dementia.      Pt does wear oxygen @ 3L 24hr/day x 2 yr.  Pt has had a moses catheter since 2016.  Dtr states MDs believe pt has slow growing prostate cancer that has impeded urination.  She has found [facility] to be willing to have pt come there.   Pt has about $[phone] left in bank.  Facilities have suggested she get pt's house ready to sell.  The room at [facility] has not been held for pt.      Katie's spouse  2 mo ago, spouse assisted her with pt.  Katie is tearful over losing spouse, possibly father soon.  Katie does have 2 dtr. Pt has told Katie repeatedly that he wants to die since his wife .  POA presented living will to hospital by her report.      3 wk ago pt was at [facility] x 1 wk- diagnosed with UTI, started on " antibiotics.  He was physically aggressive with staff there x 2 days.  He was started on Risperdal, haldol at [facility] but she has noted no improvement in the hallucinations.  She is afraid to take pt home full time because of his insistence at night when he has the hallucinations.  She has removed the gun from his home should pt go home.      Dtr reports pt fell x 5 last year.  He has walker, WC at home but doesn't use them.  Await medical clearance for admit to [facility].      D/w Mily HANLEY in Infection Control, place in Contact Isolation for now d/t recent MRSA diagnosis in urine at [facility].   Mily will contact [facility] if further updates on isolation.      Report called to Steffen HANLEY on [facility].

## 2017-09-14 NOTE — DISCHARGE SUMMARY
Date of Admission: 9/9/2017  Date of Discharge:  9/14/2017    PCP: Renzo Valdez MD      DISCHARGE DIAGNOSIS  Principal Problem:    RENZO (acute kidney injury)  Active Problems:    Urinary tract infection associated with indwelling urethral catheter    Disease of thyroid gland    COPD (chronic obstructive pulmonary disease)    Chronic respiratory failure    Dementia    Hyperkalemia    Hyponatremia    Psychosis    Urinary retention    DM II (diabetes mellitus, type II), controlled      SECONDARY DIAGNOSES  Past Medical History:   Diagnosis Date   • COPD (chronic obstructive pulmonary disease)    • Dementia    • Diabetes mellitus    • Disease of thyroid gland    • GERD (gastroesophageal reflux disease)        CONSULTS   Consults     Date and Time Order Name Status Description    9/14/2017 0543 Inpatient Consult to Psychiatrist Completed     9/9/2017 1732 Inpatient Consult to Urology      9/9/2017 1732 Inpatient Consult to Neurology Completed     9/9/2017 4829 [facility] (on-call MD unless specified) Completed           PROCEDURES PERFORMED      HOSPITAL COURSE  Patient is a 87 y.o. male presented to [facility] complaining of Confusion.  Please see the admitting history and physical for further details.  He was admitted to the hospital with acute renal failure and possible recurrent urinary tract infection.  He was hydrated and his renal function returned to baseline.  A Gonsalez catheter was reinserted.  Urine culture was negative and antibiotics were stopped.  His mental status quickly improved over the initial 24 hours here in the hospital.  Urology was consulted regarding urinary retention and chronic Gonsalez catheter.  He was started on Flomax and Gonsalez catheter was continued.  We attempted a voiding trial prior to discharge but the Gonsalez catheter needed to be reinserted secondary to urinary retention.  He's had a history with dementia and psychosis in the outpatient setting and recommendations  "were for discharge to another skilled nursing facility care for after stabilization the hospital he seemed to be doing rather well.  We attempted to get him placed in a skilled nursing facility or long-term care facility but this took a few days to get arranged.  I axis evaluate the patient to determine if he was appropriate for inpatient psychiatric evaluation.  He made some vague suicidal threats within the last 24 hours regards to being placed in an assisted living facility at this point is recommended for inpatient psychiatric stabilization.  He was seen and evaluated by psychiatry who agreed with the recommendations and we discharged to the [facility] crisis management unit today for further psychiatric stabilization.  We recommend continuing the Gonsalez catheter indefinitely and follow up with his urologist within a month after discharge from the hospital.  Otherwise from a medical perspective he remains stable today.      CONDITION ON DISCHARGE  Stable.      VITAL SIGNS  /71 (BP Location: Left arm, Patient Position: Sitting)  Pulse 68  Temp 97.4 °F (36.3 °C) (Oral)   Resp 16  Ht 67\" (170.2 cm)  Wt 152 lb 14.4 oz (69.4 kg)  SpO2 98%  BMI 23.95 kg/m2  Objective:  General Appearance:  Comfortable.    Vital signs: (most recent): Blood pressure 122/71, pulse 68, temperature 97.4 °F (36.3 °C), temperature source Oral, resp. rate 16, height 67\" (170.2 cm), weight 152 lb 14.4 oz (69.4 kg), SpO2 98 %.    Output: Producing urine and producing stool.    HEENT: Normal HEENT exam.    Lungs:  Normal respiratory rate and normal effort.  Breath sounds clear to auscultation.    Heart: Normal rate.  Regular rhythm.  S1 normal and S2 normal.    Abdomen: Abdomen is soft.  Bowel sounds are normal.   There is no abdominal tenderness.     Extremities: Normal range of motion.    Pulses: Distal pulses are intact.    Neurological: Patient is alert.  (Oriented to person occasionally to place not usually " oriented to time.  Mentation seems to wax and wane easily redirected).    Pupils:  Pupils are equal, round, and reactive to light.    Skin:  Warm and dry.                DISCHARGE DISPOSITION   [facility] Hospital or Unit ([facility])      DISCHARGE MEDICATIONS   Juliano Garzon   Home Medication Instructions EDU:[id number]    Printed on:09/14/17 7958   Medication Information                      aspirin 325 MG tablet  Take 325 mg by mouth Daily.             Fluticasone Furoate-Vilanterol (BREO ELLIPTA) 100-25 MCG/INH aerosol powder   Inhale Daily.             hydrOXYzine (ATARAX) 25 MG tablet  Take 25 mg by mouth 2 (Two) Times a Day.             levothyroxine (SYNTHROID, LEVOTHROID) 25 MCG tablet  Take 25 mcg by mouth Daily.             memantine (NAMENDA) 10 MG tablet  Take 10 mg by mouth Daily.             montelukast (SINGULAIR) 10 MG tablet  Take 10 mg by mouth Every Night.             pantoprazole (PROTONIX) 40 MG EC tablet  Take 40 mg by mouth Daily.             risperiDONE (risperDAL) 1 MG tablet  Take 1 mg by mouth 2 (Two) Times a Day.             sertraline (ZOLOFT) 50 MG tablet  Take 50 mg by mouth Daily.             tamsulosin (FLOMAX) 0.4 MG capsule 24 hr capsule  Take 1 capsule by mouth Every Night.             Vitamin D, Cholecalciferol, (CHOLECALCIFEROL) 400 units tablet  Take 400 Units by mouth Daily.               Diet Instructions     Diet: Consistent Carbohydrate       Discharge Diet:  Consistent Carbohydrate                Activity Instructions     Activity as Tolerated                 No future appointments.  Follow-up Information     Follow up with [facility] .    Specialty:  Assisted Living Facility    Contact information:    [address] [place] [address] [place] [address] [phone]          TEST  RESULTS PENDING AT DISCHARGE   Order Current Status    Methylmalonic Acid, Serum In process             Addy Pedraza MD  [facility]  09/14/17  11:56 AM      Time: greater than 30 minutes.

## 2017-09-14 NOTE — PLAN OF CARE
Problem:  Patient Care Overview (Adult)  Goal: Plan of Care Review  Outcome: Ongoing (interventions implemented as appropriate)    09/14/17 1645 09/14/17 9526   Coping/Psychosocial Response Interventions   Plan Of Care Reviewed With --  patient   Patient Care Overview   Progress --  no change   Outcome Evaluation   Outcome Summary/Follow up Plan --  The patient complained of anxiety but was unable to rate it. He deined any depression, HI, or pain. Per report the patient has been having visual hallucinations for 3 years but he is currently denying. He is disoriented to situation. Sucide precautions in place due to verbal sucidie threats made to his family, but patient is currently denying any SI. Contact precatuions for MRSA in urine. Pleasent and calm since arriving. Cooperative with medications. continuing to monitor.    Coping/Psychosocial   Patient Agreement with Plan of Care disagrees (describe)  (doesn't believe he needs to be here) --        Goal: Interdisciplinary Rounds/Family Conference  Outcome: Ongoing (interventions implemented as appropriate)  Goal: Individualization and Mutuality  Outcome: Ongoing (interventions implemented as appropriate)  Goal: Discharge Needs Assessment  Outcome: Ongoing (interventions implemented as appropriate)    Problem:  Overarching Goals  Goal: Adheres to Safety Considerations for Self and Others  Outcome: Ongoing (interventions implemented as appropriate)  Intervention: Develop/maintain Individualized Safety Plan    09/14/17 1645   Safety   Safety Measures safety rounds completed;suicide assessment completed   Mental Health Homicide Risk   Homicidal Ideation no   Provide Emotional/Physical Safety   Suicidal Ideation no         Goal: Optimized Coping Skills in Response to Life Stressors  Outcome: Ongoing (interventions implemented as appropriate)  Intervention: Promote Effective Coping Strategies    09/14/17 1645   Coping Strategies   Supportive Measures active listening  utilized;relaxation techniques promoted;self-care encouraged;self-reflection promoted;self-responsibility promoted;verbalization of feelings encouraged         Goal: Develops/Participates in Therapeutic [place] to Support Successful Transition  Outcome: Ongoing (interventions implemented as appropriate)  Intervention: Foster Therapeutic [place]    09/14/17 1643   Coping Strategies   Trust Relationship/Rapport care explained;choices provided;empathic listening provided;emotional support provided;questions answered;questions encouraged;reassurance provided;thoughts/feelings acknowledged           Problem: Thought Process Alteration (Adult)  Goal: Identify Related Risk Factors and Signs and Symptoms  Outcome: Outcome(s) achieved Date Met:  09/14/17 09/14/17 1817   Thought Process Alteration   Related Risk Factors (Thought Process Alteration) age extremes;environment unfamiliar   Signs and Symptoms (Thought Process Alteration) sleep/rest pattern disturbance;aggressive/combative behaviors  (agressive at another facility)       Goal: Improved Thought Process  Outcome: Ongoing (interventions implemented as appropriate)    09/14/17 1817   Thought Process Alteration (Adult)   Improved Thought Process making progress toward outcome

## 2017-09-14 NOTE — CONSULTS
I spoke with [facility]rina at [facility] concerning Mr. Garzon and the things he told his granddaughter. He told her he would just as soon cut his throat with a knife or get a gun and shoot himself in the head. He said that he was tired of being moved from place to place. The granddaughter Sabrina Jones told me that he said this twice to her.

## 2017-09-14 NOTE — PLAN OF CARE
Problem: Suicide Risk (Adult,Obstetrics,Pediatric)  Goal: Identify Related Risk Factors and Signs and Symptoms  Outcome: Ongoing (interventions implemented as appropriate)

## 2017-09-14 NOTE — PROGRESS NOTES
Continued Stay Note  [facility]     Patient Name: Juliano Garzon  MRN: [id number]  Today's Date: 9/14/2017    Admit Date: 9/9/2017          Discharge Plan       09/14/17 1135    Case Management/Social Work Plan    Additional Comments CCP received incoming call from Laurie [address] ([place], [phone]) who states pt's daughter informed her of planned [facility] admission. Per [place], Laurie will continue to follow pt. Adriana Gonzalez LCSW              Discharge Codes     None        Expected Discharge Date and Time     Expected Discharge Date Expected Discharge Time    Sep 12, 2017             Silva Gonzalez LCSW

## 2017-09-15 LAB — GLUCOSE BLDC GLUCOMTR-MCNC: 149 MG/DL (ref 70–130)

## 2017-09-15 PROCEDURE — 97165 OT EVAL LOW COMPLEX 30 MIN: CPT | Performed by: OCCUPATIONAL THERAPIST

## 2017-09-15 PROCEDURE — 94640 AIRWAY INHALATION TREATMENT: CPT

## 2017-09-15 PROCEDURE — 94799 UNLISTED PULMONARY SVC/PX: CPT

## 2017-09-15 PROCEDURE — 97161 PT EVAL LOW COMPLEX 20 MIN: CPT

## 2017-09-15 PROCEDURE — 82962 GLUCOSE BLOOD TEST: CPT

## 2017-09-15 RX ORDER — RISPERIDONE 1 MG/1
1 TABLET ORAL 2 TIMES DAILY
Status: DISCONTINUED | OUTPATIENT
Start: 2017-09-15 | End: 2017-09-18

## 2017-09-15 RX ORDER — OMEGA-3S/DHA/EPA/FISH OIL/D3 300MG-1000
400 CAPSULE ORAL DAILY
Status: DISCONTINUED | OUTPATIENT
Start: 2017-09-15 | End: 2017-09-21 | Stop reason: HOSPADM

## 2017-09-15 RX ORDER — LEVOTHYROXINE SODIUM 0.03 MG/1
25 TABLET ORAL
Status: DISCONTINUED | OUTPATIENT
Start: 2017-09-15 | End: 2017-09-21 | Stop reason: HOSPADM

## 2017-09-15 RX ORDER — MEMANTINE HYDROCHLORIDE 10 MG/1
10 TABLET ORAL DAILY
Status: DISCONTINUED | OUTPATIENT
Start: 2017-09-15 | End: 2017-09-21 | Stop reason: HOSPADM

## 2017-09-15 RX ORDER — PANTOPRAZOLE SODIUM 40 MG/1
40 TABLET, DELAYED RELEASE ORAL DAILY
Status: DISCONTINUED | OUTPATIENT
Start: 2017-09-15 | End: 2017-09-21 | Stop reason: HOSPADM

## 2017-09-15 RX ORDER — BUDESONIDE AND FORMOTEROL FUMARATE DIHYDRATE 80; 4.5 UG/1; UG/1
2 AEROSOL RESPIRATORY (INHALATION)
Status: DISCONTINUED | OUTPATIENT
Start: 2017-09-15 | End: 2017-09-21 | Stop reason: HOSPADM

## 2017-09-15 RX ORDER — NICOTINE POLACRILEX 4 MG
15 LOZENGE BUCCAL
Status: DISCONTINUED | OUTPATIENT
Start: 2017-09-15 | End: 2017-09-21 | Stop reason: HOSPADM

## 2017-09-15 RX ORDER — TAMSULOSIN HYDROCHLORIDE 0.4 MG/1
0.4 CAPSULE ORAL NIGHTLY
Status: DISCONTINUED | OUTPATIENT
Start: 2017-09-15 | End: 2017-09-21 | Stop reason: HOSPADM

## 2017-09-15 RX ORDER — DEXTROSE MONOHYDRATE 25 G/50ML
25 INJECTION, SOLUTION INTRAVENOUS
Status: DISCONTINUED | OUTPATIENT
Start: 2017-09-15 | End: 2017-09-21 | Stop reason: HOSPADM

## 2017-09-15 RX ORDER — ASPIRIN 325 MG
325 TABLET ORAL DAILY
Status: DISCONTINUED | OUTPATIENT
Start: 2017-09-15 | End: 2017-09-21 | Stop reason: HOSPADM

## 2017-09-15 RX ORDER — MONTELUKAST SODIUM 10 MG/1
10 TABLET ORAL NIGHTLY
Status: DISCONTINUED | OUTPATIENT
Start: 2017-09-15 | End: 2017-09-21 | Stop reason: HOSPADM

## 2017-09-15 RX ADMIN — LEVOTHYROXINE SODIUM 25 MCG: 25 TABLET ORAL at 11:48

## 2017-09-15 RX ADMIN — METFORMIN HYDROCHLORIDE 500 MG: 500 TABLET ORAL at 21:12

## 2017-09-15 RX ADMIN — MEMANTINE HYDROCHLORIDE 10 MG: 10 TABLET, FILM COATED ORAL at 11:48

## 2017-09-15 RX ADMIN — SERTRALINE 50 MG: 50 TABLET, FILM COATED ORAL at 11:48

## 2017-09-15 RX ADMIN — RISPERIDONE 1 MG: 1 TABLET, FILM COATED ORAL at 11:49

## 2017-09-15 RX ADMIN — CHOLECALCIFEROL TAB 10 MCG (400 UNIT) 400 UNITS: 10 TAB at 11:48

## 2017-09-15 RX ADMIN — ASPIRIN 325 MG: 325 TABLET ORAL at 11:49

## 2017-09-15 RX ADMIN — BUDESONIDE AND FORMOTEROL FUMARATE DIHYDRATE 2 PUFF: 80; 4.5 AEROSOL RESPIRATORY (INHALATION) at 20:16

## 2017-09-15 RX ADMIN — RISPERIDONE 1 MG: 1 TABLET, FILM COATED ORAL at 18:16

## 2017-09-15 RX ADMIN — TAMSULOSIN HYDROCHLORIDE 0.4 MG: 0.4 CAPSULE ORAL at 21:12

## 2017-09-15 RX ADMIN — MONTELUKAST 10 MG: 10 TABLET, FILM COATED ORAL at 21:12

## 2017-09-15 RX ADMIN — PANTOPRAZOLE SODIUM 40 MG: 40 TABLET, DELAYED RELEASE ORAL at 11:56

## 2017-09-15 NOTE — THERAPY DISCHARGE NOTE
Acute Care - Occupational Therapy Initial Eval/Discharge  [facility]     Patient Name: Juliano Garzon  : 1929  MRN: [id number]  Today's Date: 9/15/2017  Onset of Illness/Injury or Date of Surgery Date: 17     Referring Physician: Chuy      Admit Date: 2017       ICD-10-CM ICD-9-CM   1. Impaired functional mobility, balance, gait, and endurance Z74.09 V49.89     Patient Active Problem List   Diagnosis   • Urinary tract infection associated with indwelling urethral catheter   • Disease of thyroid gland   • COPD (chronic obstructive pulmonary disease)   • Chronic respiratory failure   • Dementia   • RENZO (acute kidney injury)   • Hyperkalemia   • Hyponatremia   • Psychosis   • Urinary retention   • DM II (diabetes mellitus, type II), controlled   • Dementia with behavioral disturbance     Past Medical History:   Diagnosis Date   • COPD (chronic obstructive pulmonary disease)    • Dementia    • Diabetes mellitus    • Disease of thyroid gland    • GERD (gastroesophageal reflux disease)      History reviewed. No pertinent surgical history.       OT ASSESSMENT FLOWSHEET (last 72 hours)      OT Evaluation       09/15/17 1308 09/15/17 1307 09/15/17 1050 17 1410 17 1347    Rehab Evaluation    Document Type evaluation  -KP  evaluation  -KPA      Subjective Information agree to therapy;no complaints  -KP  agree to therapy;no complaints  -KPA      Patient Effort, Rehab Treatment good  -KP  good  -KPA      Patient Effort, Rehab Treatment Comment   Agreeable to treatment and pleasant  -KPA      Symptoms Noted During/After Treatment none  -KP  none  -KPA      General Information    Patient Profile Review yes  -KP  yes  -KPA      Onset of Illness/Injury or Date of Surgery Date   17  -KPA      Referring Physician   Chuy  -KPA      General Observations supine in bed. nsg present in room. pt on 3 L O2  -KP  Supine in bed, NAD, 3 L O2, sitter present.  -KPA      Pertinent History Of  Current Problem psych  -KP  Psych  -KPA      Precautions/Limitations   fall precautions  -KPA      Prior Level of Function independent:;transfer;ADL's  -KP  independent:  -KPA      Equipment Currently Used at Home     --   nasal cannula 24hr/day   -PM    Plans/Goals Discussed With patient;agreed upon  -KP  patient;agreed upon  -KPA      Risks Reviewed   patient:;LOB;dizziness;change in vital signs  -KPA      Benefits Reviewed   patient:;improve function;increase independence;increase strength;increase balance  -KPA      Barriers to Rehab   medically complex   dementia  -KPA      Living Environment    Lives With alone  -KP child(shelbie), adult  -SAMARA alone  -KPA alone  -PM     Living Arrangements  house  -SAMARA house  -KPA house  -PM     Home Accessibility   no concerns;bed and bath on same level   per pt, no steps to enter front of home.  -KPA no concerns  -PM     Number of Stairs Within Home   12   on rear entrance outside.  -KPA      Type of Financial/Environmental Concern   none  -KPA none  -PM     Transportation Available   family or friend will provide  -KPA family or friend will provide  -PM     Clinical Impression    Criteria for Skilled Therapeutic Interventions Met no problems identified which require skilled intervention  -KP        Therapy Frequency evaluation only  -KP        Anticipated Discharge Disposition extended care facility  -KP        Functional Level Prior    Ambulation     0-->independent  -PM    Transferring     0-->independent  -PM    Toileting     0-->independent  -PM    Bathing     0-->independent  -PM    Dressing     0-->independent  -PM    Eating     0-->independent   dtr brought food into home  -PM    Communication     2-->difficulty understanding (not related to language barrier)  -PM    Swallowing     0-->swallows foods/liquids without difficulty  -PM    Pain Assessment    Pain Assessment No/denies pain  -KP  No/denies pain  -KPA      Vision Assessment/Intervention    Visual Impairment [facility]   -KP  WFL  -KPA      Cognitive Assessment/Intervention    Current Cognitive/Communication Assessment functional  -KP  functional  -KPA      Orientation Status oriented x 4  -KP  oriented x 4  -KPA      Follows Commands/Answers Questions 100% of the time;able to follow single-step instructions  -KP  100% of the time;needs cueing;needs increased time  -KPA      Personal Safety WNL/WFL  -KP        Personal Safety Interventions fall prevention program maintained;nonskid shoes/slippers when out of bed  -KP        ROM (Range of Motion)    General ROM no range of motion deficits identified  -KP  no range of motion deficits identified  -KPA      General ROM Detail 8/8 B UE  -KP        MMT (Manual Muscle Testing)    General MMT Assessment no strength deficits identified  -KP  no strength deficits identified  -KPA      General MMT Assessment Detail B UE 4/5 or greater. very strong for his age  -KP  B LE grossly WFL  -KPA      Bed Mobility, Assessment/Treatment    Bed Mob, Supine to Sit, [place] conditional independence  -KP  supervision required  -KPA      Bed Mob, Sit to Supine, [place] not tested  -KP  supervision required  -KPA      Transfer Assessment/Treatment    Transfers, Bed-Chair [place] supervision required  -KP        Transfers, Chair-Bed [place] supervision required  -KP        Transfers, Bed-Chair-Bed, Assist Device rolling walker  -KP        Transfers, Sit-Stand [place] supervision required  -KP        Transfers, Stand-Sit [place] supervision required  -KP        Toilet Transfer, [place] supervision required  -KP        Toilet Transfer, Assistive Device --   none  -KP        Lower Body Dressing Assessment/Training    LB Dressing Assess/Train, Clothing Type doffing:;donning:;pants;slipper socks  -KP        LB Dressing Assess/Train, Position sitting;standing  -KP        LB Dressing Assess/Train, [place] supervision required;set up required  -KP        Toileting  Assessment/Training    Toileting Assess/Train, Position sitting;standing  -KP        Toileting Assess/Train, Indepen Level conditional independence;supervision required;set up required  -        Grooming Assessment/Training    Grooming Assess/Train, Position standing  -        Grooming Assess/Train, Indepen Level conditional independence;set up required;supervision required  -        Motor Skills/Interventions    Additional Documentation Balance Skills Training (Group);Fine Motor Coordination Training (Group);Functional Endurance (Group)  -        Balance Skills Training    Sitting-Level of Assistance Distant supervision  -        Sitting-Balance Support Feet supported  -        Standing-Level of Assistance Distant supervision;Close supervision  -        Static Standing Balance Support No upper extremity supported  -        Fine Motor Coordination Training    Opposition Left:;Right:;intact  -        Functional Endurance    Detail (Functional Endurance) good  -KP        Positioning and Restraints    Pre-Treatment Position in bed  -        Post Treatment Position chair  -KP        In Chair sitting;with other staff  -          09/14/17 0500                Living Environment    Lives With facility resident  -        Living Arrangements other (see comments)   [facility]  -          User Key  (r) = Recorded By, (t) = Taken By, (c) = Cosigned By    Initials Name Effective Dates     Mallorie Domingo, OTR 04/13/15 -     KPA Rochelle De León, PT 12/01/15 -     ML Sabrina Osborn RN 06/16/16 -     PM Lucila De Jesus, TALON 06/16/16 -     SAMARA Samantha Stephens, W 08/03/17 -           Occupational Therapy Education     Title: PT OT SLP Therapies (Resolved)     Topic: Occupational Therapy (Resolved)     Point: ADL training (Resolved)    Description: Instruct learner(s) on proper safety adaptation and remediation techniques during self care or transfers.   Instruct in  proper use of assistive devices.    Learning Progress Summary    Learner Readiness Method Response Comment Documented by Status   Patient Acceptance MARIAN HAILE DU ed pt on OT role. ed pt on safety w. Tsf. ed pt on safety w. ADLs. pt demo LBD, grooming, and toileting w. SBA to mod I.  09/15/17 1324 Done               Point: Precautions (Resolved)    Description: Instruct learner(s) on prescribed precautions during self-care and functional transfers.    Learning Progress Summary    Learner Readiness Method Response Comment Documented by Status   Patient Acceptance MARIAN HAILE DU ed pt on OT role. ed pt on safety w. Tsf. ed pt on safety w. ADLs. pt demo LBD, grooming, and toileting w. SBA to mod I.  09/15/17 1324 Done                      User Key     Initials Effective Dates Name Provider Type [facility] 04/13/15 -  THAO Munoz Occupational Therapist OT                OT Recommendation and Plan  Anticipated Discharge Disposition: extended care facility  Therapy Frequency: evaluation only                  Outcome Measures       09/15/17 1325          How much help from another is currently needed...    Putting on and taking off regular lower body clothing? 4  -KP      Bathing (including washing, rinsing, and drying) 3  -KP      Toileting (which includes using toilet bed pan or urinal) 4  -KP      Putting on and taking off regular upper body clothing 4  -KP      Taking care of personal grooming (such as brushing teeth) 4  -KP      Eating meals 4  -      Score 23  -      Functional Assessment    Outcome Measure Options AM-PAC 6 Clicks Daily Activity (OT)  -        User Key  (r) = Recorded By, (t) = Taken By, (c) = Cosigned By    Initials Name Provider Type     THAO Munoz Occupational Therapist          Time Calculation:         Time Calculation- OT       09/15/17 1326          Time Calculation- OT    OT Start Time 1240  -      OT Stop Time 1300  -      OT Time Calculation (min)  20 min  -      OT Received On 09/15/17  -        User Key  (r) = Recorded By, (t) = Taken By, (c) = Cosigned By    Initials Name Provider Type     THAO Munoz Occupational Therapist          Therapy Charges for Today     Code Description Service Date Service Provider Modifiers Qty    [id number]  OT EVAL LOW COMPLEXITY 2 9/15/2017 THAO Munoz GO 1               OT Discharge Summary  Anticipated Discharge Disposition: extended care facility    THAO Munoz  9/15/2017

## 2017-09-15 NOTE — PLAN OF CARE
Problem:  Patient Care Overview (Adult)  Goal: Plan of Care Review  Outcome: Ongoing (interventions implemented as appropriate)    09/14/17 2315 09/15/17 0353   Coping/Psychosocial Response Interventions   Plan Of Care Reviewed With patient --    Outcome Evaluation   Outcome Summary/Follow up Plan --  Pt denied all issues, no thoughts to hurt self or others, stated he doesn't know why he is here, he would never hurt himself. Cooperative and compliant, slept most this shift. Will continue to monitor changes in mood and beahviors, provide feedback and support.   Coping/Psychosocial   Patient Agreement with Plan of Care disagrees (describe)  (Pt still believes ) --          Problem:  Overarching Goals  Goal: Adheres to Safety Considerations for Self and Others  Outcome: Ongoing (interventions implemented as appropriate)  Intervention: Develop/maintain Individualized Safety Plan    09/14/17 2315 09/15/17 0353   Provide Emotional/Physical Safety   Suicidal Ideation no --    Willingness to Contact Staff Member if Feeling Like Hurting Self --  yes   Mental Health Homicide Risk   Homicidal Ideation no --    Willingness to Contact Staff Member if Feeling Like Hurting Others --  yes   Safety   Safety Measures safety rounds completed;suicide assessment completed --          Goal: Optimized Coping Skills in Response to Life Stressors  Outcome: Ongoing (interventions implemented as appropriate)  Intervention: Promote Effective Coping Strategies    09/14/17 2315   Coping Strategies   Supportive Measures active listening utilized;verbalization of feelings encouraged         Goal: Develops/Participates in Therapeutic [place] to Support Successful Transition  Outcome: Ongoing (interventions implemented as appropriate)  Intervention: Foster Therapeutic [place]    09/14/17 2315   Coping Strategies   Trust Relationship/Rapport care explained;choices provided;emotional support provided;empathic listening provided;questions  answered;questions encouraged;reassurance provided;thoughts/feelings acknowledged       Intervention: Mutually Develop Transition Plan    09/14/17 1084   OTHER   Transition Support crisis management plan promoted

## 2017-09-15 NOTE — PLAN OF CARE
Problem:  Patient Care Overview (Adult)  Goal: Plan of Care Review  Outcome: Ongoing (interventions implemented as appropriate)    09/15/17 1523   Coping/Psychosocial Response Interventions   Plan Of Care Reviewed With patient   Patient Care Overview   Progress improving   Outcome Evaluation   Outcome Summary/Follow up Plan The patient is alert and oriented X4. The patient deneid any anxiety, depression, SI, HI, pain, or hallucinations. The patient claims he does not remember making any suicidal statements. Cooperative with medications. Continuing to monitor.    Coping/Psychosocial   Patient Agreement with Plan of Care agrees       Goal: Interdisciplinary Rounds/Family Conference  Outcome: Ongoing (interventions implemented as appropriate)  Goal: Individualization and Mutuality  Outcome: Ongoing (interventions implemented as appropriate)  Goal: Discharge Needs Assessment  Outcome: Ongoing (interventions implemented as appropriate)    Problem:  Overarching Goals  Goal: Adheres to Safety Considerations for Self and Others  Outcome: Ongoing (interventions implemented as appropriate)  Intervention: Develop/maintain Individualized Safety Plan    09/15/17 0910   Provide Emotional/Physical Safety   Suicidal Ideation no   Willingness to Contact Staff Member if Feeling Like Hurting Self yes   Mental Health Homicide Risk   Homicidal Ideation no   Willingness to Contact Staff Member if Feeling Like Hurting Others yes   Safety   Safety Measures safety rounds completed;suicide assessment completed         Goal: Optimized Coping Skills in Response to Life Stressors  Outcome: Ongoing (interventions implemented as appropriate)  Intervention: Promote Effective Coping Strategies    09/15/17 0910   Coping Strategies   Supportive Measures active listening utilized;relaxation techniques promoted;self-care encouraged;self-reflection promoted;self-responsibility promoted;verbalization of feelings encouraged         Goal:  Develops/Participates in Therapeutic Bartley to Support Successful Transition  Outcome: Ongoing (interventions implemented as appropriate)  Intervention: Foster Therapeutic [place]    09/15/17 0910   Coping Strategies   Trust Relationship/Rapport care explained;choices provided;emotional support provided;empathic listening provided;questions answered;questions encouraged;reassurance provided;thoughts/feelings acknowledged           Problem: Thought Process Alteration (Adult)  Goal: Improved Thought Process  Outcome: Ongoing (interventions implemented as appropriate)    09/15/17 1523   Thought Process Alteration (Adult)   Improved Thought Process making progress toward outcome

## 2017-09-15 NOTE — PLAN OF CARE
Problem: [facility] Patient Care Overview (Adult)  Goal: Interdisciplinary Rounds/Family Conference  Outcome: Ongoing (interventions implemented as appropriate)    09/15/17 0956   Interdisciplinary Rounds/Family Conf   Summary Treatment team met to review the Care Plan. Long term goal is to have pt deny suicide thoughts and voice future oriented thinking in 12 days. Pt was admitted to [facility] after voicing suicide threats and pt denies the threats. Meds will be adjusted in a safe environment. PT and OT will be requested to meet pt.    Participants art therapy;;nursing;pharmacy;social work      Patient/Guardian Signature: __________________________________             Psychiatrist Signature: ______________________________________             Therapist Signature: ________________________________________              Nurse Signature: ___________________________________________              Staff Signature: ____________________________________________             Staff Signature: ____________________________________________              Staff Signature: ____________________________________________              Staff Signature:

## 2017-09-15 NOTE — PLAN OF CARE
Problem: [facility] Patient Care Overview (Adult)  Goal: Plan of Care Review  Outcome: Outcome(s) achieved Date Met:  09/15/17    09/15/17 1794   Coping/Psychosocial Response Interventions   Plan Of Care Reviewed With patient   Patient Care Overview   Progress improving   Outcome Evaluation   Outcome Summary/Follow up Plan Pt evaluated by PT. Pt is at Sup level for all mobility and transfers. Pt safety is limited by cognition, pulmonary status, and high level balance. Pt appears to be at baseline for mobility. Will d/c at this time . Rec Sup at home for safety.   Coping/Psychosocial   Patient Agreement with Plan of Care agrees

## 2017-09-15 NOTE — PLAN OF CARE
Problem:  Patient Care Overview (Adult)  Goal: Plan of Care Review    09/15/17 1327   Coping/Psychosocial Response Interventions   Plan Of Care Reviewed With patient   Patient Care Overview   Progress no change   Outcome Evaluation   Outcome Summary/Follow up Plan pt evaluted for OT. pt is SBA to mod I w. all ADL. pt demo donning pants, toileting, grooming. pt walks to chair and to BR w. supervision w. wx in hallway and no walker when in room. pt strength is good. no current skilled OT needs. OT signing off.   Coping/Psychosocial   Patient Agreement with Plan of Care agrees

## 2017-09-15 NOTE — PROGRESS NOTES
Neuropsych consult received and chart reviewed.  Will contact unit tomorrow regarding scheduling evaluation.

## 2017-09-15 NOTE — CONSULTS
CONSULT NOTE    INTERNAL MEDICINE   [facility]       Patient Identification:  Name: Juliano Garzon  Age: 87 y.o.  Sex: male  :  1929  MRN: [id number]             Date of Consultation:  2017        Primary Care Physician: Renzo Valdez MD                               Requesting Physician: Dr. Lars Victor  Reason for Consultation: Management of COPD diabetes hypothyroidism and urinary retention    Chief Complaint:  Admitted to psych unit for underlying dementia and suicidal ideation    History of Present Illness:   Source of information is patient himself which is limited because of him being very hard of hearing and because of him not being able to use his dentures and his speech is altered.    Patient is a 87-year-old male who was admitted to our facility on 2017 when he presented with confusion.  Patient was recently hospitalized at [facility] for MRSA kidney infection and was sent home with the Gonsalez catheter.  Evidently after discharge patient was wandering in the neighborhood with catheter cut and not attached to the back.  He was also not wearing oxygen and his oxygen saturation was 70% when he was found.  He was brought in by EMS to the [facility] facility.  According to the database it looks like he was discharged on Bactrim and vancomycin for his MRSA kidney infection.  Patient has been hospitalized since 2017 through 2017 and was admitted to see a new for management of behavioral changes with associated dementia and suicidal ideations.  Patient had issues with urinary retention and he was sent to this facility with Gonsalez catheter.  Patient denies any symptoms at this time.  During his stay in the acute care facility discussion was seen by urology service after his urine culture come back negative his antibiotics were stopped.  Patient failed voiding trial and had to have Gonsalez catheter reinserted because of the urinary  retention.  He was supposed to be released to the rehabilitation facility or skilled nursing facility but because of expressed ideas about suicidal threats he was placed in CME unit for further care.  Patient currently denies any symptoms at this time.      Past Medical History:  Past Medical History:   Diagnosis Date   • COPD (chronic obstructive pulmonary disease)    • Dementia    • Diabetes mellitus    • Disease of thyroid gland    • GERD (gastroesophageal reflux disease)      Past Surgical History:  History reviewed. No pertinent surgical history.   Home Meds:  Prescriptions Prior to Admission   Medication Sig Dispense Refill Last Dose   • aspirin 325 MG tablet Take 325 mg by mouth Daily.      • Fluticasone Furoate-Vilanterol (BREO ELLIPTA) 100-25 MCG/INH aerosol powder  Inhale Daily.      • hydrOXYzine (ATARAX) 25 MG tablet Take 25 mg by mouth 2 (Two) Times a Day.      • levothyroxine (SYNTHROID, LEVOTHROID) 25 MCG tablet Take 25 mcg by mouth Daily.      • memantine (NAMENDA) 10 MG tablet Take 10 mg by mouth Daily.      • montelukast (SINGULAIR) 10 MG tablet Take 10 mg by mouth Every Night.      • pantoprazole (PROTONIX) 40 MG EC tablet Take 40 mg by mouth Daily.      • risperiDONE (risperDAL) 1 MG tablet Take 1 mg by mouth 2 (Two) Times a Day.      • sertraline (ZOLOFT) 50 MG tablet Take 50 mg by mouth Daily.      • tamsulosin (FLOMAX) 0.4 MG capsule 24 hr capsule Take 1 capsule by mouth Every Night.      • Vitamin D, Cholecalciferol, (CHOLECALCIFEROL) 400 units tablet Take 400 Units by mouth Daily.        Current Meds:     Current Facility-Administered Medications:   •  acetaminophen (TYLENOL) tablet 650 mg, 650 mg, Oral, Q4H PRN, Toño Vera III, MD  •  aluminum-magnesium hydroxide-simethicone (MAALOX MAX) 400-400-40 MG/5ML suspension 15 mL, 15 mL, Oral, Q6H PRN, Toño Vera III, MD  •  budesonide-formoterol (SYMBICORT) 80-4.5 MCG/ACT inhaler 2 puff, 2 puff, Inhalation, BID - RT,  "Toño Vera III, MD  •  magnesium hydroxide (MILK OF MAGNESIA) suspension 2400 mg/10mL 10 mL, 10 mL, Oral, Daily PRN, Toño Vera III, MD  •  montelukast (SINGULAIR) tablet 10 mg, 10 mg, Oral, Nightly, Toño Vera III, MD  •  tamsulosin (FLOMAX) 24 hr capsule 0.4 mg, 0.4 mg, Oral, Nightly, Toño Vera III, MD  Allergies:  No Known Allergies  Social History:   Social History   Substance Use Topics   • Smoking status: Former Smoker     Types: Cigarettes   • Smokeless tobacco: Not on file   • Alcohol use No      Family History:  History reviewed. No pertinent family history.       Review of Systems  See history of present illness and past medical history.  Limited because of decreased hearing and an underlying dementia.    Vitals:   Ht 67\" (170.2 cm)  Wt 158 lb 11.2 oz (72 kg)  BMI 24.86 kg/m2  I/O: No intake or output data in the 24 hours ending 09/14/17 2032  Exam:  General Appearance:    Awake and cooperative in no distress and does not appear to be toxic or septic.     Head:    Normocephalic, without obvious abnormality, atraumatic   Eyes:    PERRL, conjunctiva/corneas clear, EOM's intact, both eyes   Ears:    Normal external ear canals, both ears   Nose:   Nares normal, septum midline, mucosa normal, no drainage    or sinus tenderness   Throat:   Edentulous.     Neck:   Supple, symmetrical, trachea midline, no adenopathy;     thyroid:  no enlargement/tenderness/nodules; no carotid    bruit or JVD   Back:     Symmetric, no curvature, ROM normal, no CVA tenderness   Lungs:     Clear to auscultation bilaterally, respirations unlabored   Chest Wall:    No tenderness or deformity    Heart:    Regular rate and rhythm, S1 and S2 normal, no murmur, rub   or gallop   Abdomen:     Soft, non-tender, bowel sounds active all four quadrants,     : Gonsalez catheter in place    Extremities:   Extremities normal, atraumatic, no cyanosis or edema   Pulses:   Pulses palpable " in all extremities; symmetric all extremities   Skin:   Skin color normal, Skin is warm and dry,  no rashes or palpable lesions   Neurologic:   CNII-XII intact, motor strength grossly intact, sensation grossly intact to light touch, no focal deficits noted       Data Review:      I reviewed the patient's new clinical results.    Results from last 7 days  Lab Units 09/10/17  0759 09/09/17  0800   WBC 10*3/mm3 5.27 8.32   HEMOGLOBIN g/dL 10.5* 11.9*   PLATELETS 10*3/mm3 133* 81*       Results from last 7 days  Lab Units 09/11/17  0554 09/10/17  0759 09/09/17  0800   SODIUM mmol/L 139 142 135*   POTASSIUM mmol/L 5.1 5.1 5.8*   CHLORIDE mmol/L 106 108* 100   CO2 mmol/L 22.9 23.6 25.4   BUN mg/dL 18 19 27*   CREATININE mg/dL 1.19 1.39* 1.58*   CALCIUM mg/dL 8.4* 8.4* 9.0   GLUCOSE mg/dL 99 89 109*       Assessment:  Active Hospital Problems (** Indicates Principal Problem)    Diagnosis Date Noted   • **Dementia with behavioral disturbance [F03.91] 09/14/2017   • DM II (diabetes mellitus, type II), controlled [E11.9] 09/09/2017   • COPD (chronic obstructive pulmonary disease) [J44.9] 09/09/2017   • Psychosis [F29] 09/09/2017   • Disease of thyroid gland [E07.9] 09/09/2017   • Urinary tract infection associated with indwelling urethral catheter [T83.511A, N39.0] 09/09/2017      Resolved Hospital Problems    Diagnosis Date Noted Date Resolved   No resolved problems to display.       Plan:  Continue with Gonsalez catheter, Accu-Cheks and sliding scale coverage, nebulizer treatment and continuation of hypothyroidism treatment.  Monitor renal function and follow his clinical course will follow this patient with you.    Jason Liriano MD   9/14/2017  8:32 PM  Much of this encounter note is an electronic transcription/translation of spoken language to printed text. The electronic translation of spoken language may permit erroneous, or at times, nonsensical words or phrases to be inadvertently transcribed; Although I have reviewed the  note for such errors, some may still exist

## 2017-09-15 NOTE — THERAPY DISCHARGE NOTE
Inpatient Rehabilitation - Physical Therapy Initial Eval/Discharge  [facility]     Patient Name: Juliano Garzon  : 1929  MRN: [id number]  Today's Date: 9/15/2017   Onset of Illness/Injury or Date of Surgery Date: 17  Date of Referral to PT: 09/15/17  Referring Physician: Chuy      Admit Date: 2017    Visit Dx:    ICD-10-CM ICD-9-CM   1. Impaired functional mobility, balance, gait, and endurance Z74.09 V49.89     Patient Active Problem List   Diagnosis   • Urinary tract infection associated with indwelling urethral catheter   • Disease of thyroid gland   • COPD (chronic obstructive pulmonary disease)   • Chronic respiratory failure   • Dementia   • RENZO (acute kidney injury)   • Hyperkalemia   • Hyponatremia   • Psychosis   • Urinary retention   • DM II (diabetes mellitus, type II), controlled   • Dementia with behavioral disturbance     Past Medical History:   Diagnosis Date   • COPD (chronic obstructive pulmonary disease)    • Dementia    • Diabetes mellitus    • Disease of thyroid gland    • GERD (gastroesophageal reflux disease)      History reviewed. No pertinent surgical history.       PT ASSESSMENT (last 72 hours)      PT Evaluation       09/15/17 1308 09/15/17 1307    Rehab Evaluation    Document Type evaluation  -     Subjective Information agree to therapy;no complaints  -KP     Patient Effort, Rehab Treatment good  -KP     Symptoms Noted During/After Treatment none  -KP     General Information    Patient Profile Review yes  -KP     General Observations supine in bed. nsg present in room. pt on 3 L O2  -KP     Pertinent History Of Current Problem psych  -KP     Prior Level of Function independent:;transfer;ADL's  -     Plans/Goals Discussed With patient;agreed upon  -     Living Environment    Lives With alone  - child(shelbie), adult  -SAMARA    Living Arrangements  house  -SAMARA    Pain Assessment    Pain Assessment No/denies pain  -     Vision Assessment/Intervention    Visual  Impairment WFL  -KP     Cognitive Assessment/Intervention    Current Cognitive/Communication Assessment functional  -     Orientation Status oriented x 4  -KP     Follows Commands/Answers Questions 100% of the time;able to follow single-step instructions  -     Personal Safety WNL/WFL  -KP     Personal Safety Interventions fall prevention program maintained;nonskid shoes/slippers when out of bed  -     ROM (Range of Motion)    General ROM no range of motion deficits identified  -     General ROM Detail 8/8 B UE  -     MMT (Manual Muscle Testing)    General MMT Assessment no strength deficits identified  -     General MMT Assessment Detail B UE 4/5 or greater. very strong for his age  -     Bed Mobility, Assessment/Treatment    Bed Mob, Supine to Sit, [place] conditional independence  -     Bed Mob, Sit to Supine, [place] not tested  -     Transfer Assessment/Treatment    Transfers, Bed-Chair [place] supervision required  -     Transfers, Chair-Bed [place] supervision required  -     Transfers, Bed-Chair-Bed, Assist Device rolling walker  -     Transfers, Sit-Stand [place] supervision required  -     Transfers, Stand-Sit [place] supervision required  -     Toilet Transfer, [place] supervision required  -     Toilet Transfer, Assistive Device --   none  -     Motor Skills/Interventions    Additional Documentation Balance Skills Training (Group);Fine Motor Coordination Training (Group);Functional Endurance (Group)  -     Balance Skills Training    Sitting-Level of Assistance Distant supervision  -     Sitting-Balance Support Feet supported  -     Standing-Level of Assistance Distant supervision;Close supervision  -     Static Standing Balance Support No upper extremity supported  -     Fine Motor Coordination Training    Opposition Left:;Right:;intact  -KP     Functional Endurance    Detail (Functional Endurance) good  -KP     Positioning and  Restraints    Pre-Treatment Position in bed  -KP     Post Treatment Position chair  -     In Chair sitting;with other staff  -       09/15/17 1050 09/14/17 1410    Rehab Evaluation    Document Type evaluation  -KPA     Subjective Information agree to therapy;no complaints  -KPA     Patient Effort, Rehab Treatment good  -KPA     Patient Effort, Rehab Treatment Comment Agreeable to treatment and pleasant  -KPA     Symptoms Noted During/After Treatment none  -KPA     General Information    Patient Profile Review yes  -KPA     Onset of Illness/Injury or Date of Surgery Date 09/09/17  -KPA     Referring Physician Chuy  -KPA     General Observations Supine in bed, NAD, 3 L O2, sitter present.  -KPA     Pertinent History Of Current Problem Psych  -KPA     Precautions/Limitations fall precautions  -KPA     Prior Level of Function independent:  -KPA     Plans/Goals Discussed With patient;agreed upon  -KPA     Risks Reviewed patient:;LOB;dizziness;change in vital signs  -KPA     Benefits Reviewed patient:;improve function;increase independence;increase strength;increase balance  -KPA     Barriers to Rehab medically complex   dementia  -KPA     Living Environment    Lives With alone  -KPA alone  -PM    Living Arrangements house  -KPA house  -PM    Home Accessibility no concerns;bed and bath on same level   per pt, no steps to enter front of home.  -KPA no concerns  -PM    Number of Stairs Within Home 12   on rear entrance outside.  -KPA     Type of Financial/Environmental Concern none  -KPA none  -PM    Transportation Available family or friend will provide  -KPA family or friend will provide  -PM    Clinical Impression    Date of Referral to PT 09/15/17  -KPA     PT Diagnosis impaired balance, endurnace and gait.  -KPA     Patient/Family Goals Statement return home  -KPA     Criteria for Skilled Therapeutic Interventions Met current level of function same as previous level of function  -KPA      Pathology/Pathophysiology Noted (Describe Specifically for Each System) pulmonary  -KPA     Pain Assessment    Pain Assessment No/denies pain  -KPA     Vision Assessment/Intervention    Visual Impairment WFL  -KPA     Cognitive Assessment/Intervention    Current Cognitive/Communication Assessment functional  -KPA     Orientation Status oriented x 4  -KPA     Follows Commands/Answers Questions 100% of the time;needs cueing;needs increased time  -KPA     Personal Safety WNL/WFL  -KPA     Personal Safety Interventions fall prevention program maintained;gait belt;nonskid shoes/slippers when out of bed;supervised activity  -KPA     ROM (Range of Motion)    General ROM no range of motion deficits identified  -KPA     MMT (Manual Muscle Testing)    General MMT Assessment no strength deficits identified  -KPA     General MMT Assessment Detail B LE grossly WFL  -KPA     Bed Mobility, Assessment/Treatment    Bed Mob, Supine to Sit, [place] supervision required  -KPA     Bed Mob, Sit to Supine, [place] supervision required  -KPA     Transfer Assessment/Treatment    Transfers, Sit-Stand [place] supervision required  -KPA     Transfers, Stand-Sit [place] supervision required  -KPA     Transfer, Safety Issues step length decreased;weight-shifting ability decreased  -KPA     Transfer, Comment guarded but safe  -KPA     Gait Assessment/Treatment    Gait, [place] Level supervision required;set up required  -KPA     Gait, Distance (Feet) 150  -KPA     Gait, Gait Deviations bilateral:;decreased heel strike;toe-to-floor clearance decreased  -KPA     Gait, Safety Issues supplemental O2;step length decreased  -KPA     Gait, Comment gait is slow but steady - suspect it is close to pts baseline.  -KPA     Positioning and Restraints    Pre-Treatment Position in bed  -KPA     Post Treatment Position bed  -KPA     In Bed supine;notified nsg;encouraged to call for assist;patient within staff view   24 hour sitter  -KPA        09/14/17 1347 09/14/17 0500    General Information    Equipment Currently Used at Home --   nasal cannula 24hr/day   -PM     Living Environment    Lives With  facility resident  -    Living Arrangements  other (see comments)   probable discharge to [facility]  -      User Key  (r) = Recorded By, (t) = Taken By, (c) = Cosigned By    Initials Name Provider Type     Mallorie Domingo, OTR Occupational Therapist    KPA Rochelle De León, PT Physical Therapist     Sabrina Osborn, RN Registered Nurse    PM Lucila De Jesus, TALON Registered Nurse    SAMARA Stephens, BEARW           Physical Therapy Education     Title: PT OT SLP Therapies (Done)     Topic: Physical Therapy (Done)     Point: Mobility training (Done)    Learning Progress Summary    Learner Readiness Method Response Comment Documented by Status   Patient Acceptance MARIAN HAILE DU Pt to use walker when fatigued.  09/15/17 1423 Done               Point: Precautions (Done)    Learning Progress Summary    Learner Readiness Method Response Comment Documented by Status   Patient Acceptance MARIAN HAILE DU Pt to use walker when fatigued.  09/15/17 1423 Done                      User Key     Initials Effective Dates Name Provider Type Discipline     12/01/15 -  Rochelle De León, PT Physical Therapist PT                PT Recommendation and Plan  Anticipated Discharge Disposition: home with 24/7 care  PT Frequency: evaluation only  Plan of Care Review  Plan Of Care Reviewed With: patient  Progress: improving  Outcome Summary/Follow up Plan: Pt evaluated by PT.  Pt is at Sup level for all mobility and transfers.  Pt safety is limited by cognition, pulmonary status, and high level balance.  Pt appears to be at baseline for mobility.  Will d/c at this time .  Rec Sup at home for safety.              Outcome Measures       09/15/17 1325          How much difficulty does the patient currently have...    Turning from your back to your side  while in flat bed without using bedrails? 4  -KP      Standing up from a chair using your arms (e.g., wheelchair, bedside chair)? 4  -KP      Moving from lying on back to sitting on the side of a flat bed without bedrails? 4  -KP      How much help from another person do you currently need...    Moving to and from a bed to a chair (including a wheelchair)? 4  -KP      Climbing 3-5 steps with a railing? 3  -KP      To walk in hospital room? 3  -KP      AM-PAC 6 Clicks Score 22  -KP      How much help from another is currently needed...    Putting on and taking off regular lower body clothing? 4  -KPA      Bathing (including washing, rinsing, and drying) 3  -KPA      Toileting (which includes using toilet bed pan or urinal) 4  -KPA      Putting on and taking off regular upper body clothing 4  -KPA      Taking care of personal grooming (such as brushing teeth) 4  -KPA      Eating meals 4  -KPA      Score 23  -KPA      Functional Assessment    Outcome Measure Options AM-PAC 6 Clicks Daily Activity (OT)  -KPA        User Key  (r) = Recorded By, (t) = Taken By, (c) = Cosigned By    Initials Name Provider Type    [facility] Mallorie Domingo, OTR Occupational Therapist     Rochelle De León, PT Physical Therapist           Time Calculation:         PT Charges       09/15/17 1428          Time Calculation    Start Time 1035  -KP      Stop Time 1100  -KP      Time Calculation (min) 25 min  -KP      PT Received On 09/15/17  -KP        User Key  (r) = Recorded By, (t) = Taken By, (c) = Cosigned By    Initials Name Provider Type     Rochelle De León, PT Physical Therapist          Therapy Charges for Today     Code Description Service Date Service Provider Modifiers Qty    [id number]  PT EVAL LOW COMPLEXITY 2 9/15/2017 Rochelle De León, PT GP 1          PT G-Codes  Outcome Measure Options: AM-PAC 6 Clicks Daily Activity (OT)    PT Discharge Summary  Anticipated Discharge Disposition: home with 24/7 care  Reason for Discharge:  Maximum functional potential achieved  Discharge Destination: Home with assist, Home with home health    Rochelle De León, PT  9/15/2017

## 2017-09-15 NOTE — PLAN OF CARE
Problem:  Patient Care Overview (Adult)  Goal: Discharge Needs Assessment  Outcome: Ongoing (interventions implemented as appropriate)    09/15/17 1050 09/15/17 1307 09/15/17 1312   Discharge Needs Assessment   Concerns To Be Addressed --  basic needs concerns;suicidal concerns;mental health concerns;decision making concerns;grief and loss concerns;physical/sexual safety concerns --    Discharge Planning Comments --  --  Per daughter, Katie pt will most likely return home w/24-hour sitter; Pt will receive Neuropsych testing, PT, & OT.   Living Environment   Transportation Available family or friend will provide --  --

## 2017-09-15 NOTE — PROGRESS NOTES
Continued Stay Note  [facility]     Patient Name: Juliano Garzon  MRN: [id number]  Today's Date: 9/15/2017    Admit Date: 9/14/2017          Discharge Plan       09/15/17 1309    Case Management/Social Work Plan    Plan Per daughterKatie pt will most likely return home w/24-hour sitter; Pt will receive Neuropsych testing, PT, & OT.    Additional Comments SW attempted to meet w/pt who was sleeping.  SW spoke w/daughterKatie at ph. [phone] to discuss pt's care.  Katie stated that pt's behaviors declined when he knew that he was going to [facility]; Katie stated that pt did not want to go to [facility].  SW explored whether this would continue to be an option; daughterKatie stated that this will  most likely not be an option for pt.  She stated that pt will most likely return home & that she would sit w/him during the day & have already spoke w/a lady who would be a sitter overnight so that pt has someone w/him 24 hours a day.  SW will continue to explore w/the family discharge options.              Discharge Codes     None            GALLO Rm

## 2017-09-16 LAB
ANION GAP SERPL CALCULATED.3IONS-SCNC: 10.4 MMOL/L
BUN BLD-MCNC: 24 MG/DL (ref 8–23)
BUN/CREAT SERPL: 20.2 (ref 7–25)
CALCIUM SPEC-SCNC: 9 MG/DL (ref 8.6–10.5)
CHLORIDE SERPL-SCNC: 103 MMOL/L (ref 98–107)
CHOLEST SERPL-MCNC: 117 MG/DL (ref 0–200)
CO2 SERPL-SCNC: 26.6 MMOL/L (ref 22–29)
CREAT BLD-MCNC: 1.19 MG/DL (ref 0.76–1.27)
DEPRECATED RDW RBC AUTO: 44 FL (ref 37–54)
ERYTHROCYTE [DISTWIDTH] IN BLOOD BY AUTOMATED COUNT: 13.1 % (ref 11.5–14.5)
GFR SERPL CREATININE-BSD FRML MDRD: 58 ML/MIN/1.73
GLUCOSE BLD-MCNC: 126 MG/DL (ref 65–99)
GLUCOSE BLDC GLUCOMTR-MCNC: 109 MG/DL (ref 70–130)
GLUCOSE BLDC GLUCOMTR-MCNC: 120 MG/DL (ref 70–130)
GLUCOSE BLDC GLUCOMTR-MCNC: 121 MG/DL (ref 70–130)
GLUCOSE BLDC GLUCOMTR-MCNC: 99 MG/DL (ref 70–130)
HCT VFR BLD AUTO: 34 % (ref 40.4–52.2)
HDLC SERPL-MCNC: 24 MG/DL (ref 40–60)
HGB BLD-MCNC: 11 G/DL (ref 13.7–17.6)
LDLC SERPL CALC-MCNC: 75 MG/DL (ref 0–100)
LDLC/HDLC SERPL: 3.14 {RATIO}
MCH RBC QN AUTO: 29.9 PG (ref 27–32.7)
MCHC RBC AUTO-ENTMCNC: 32.4 G/DL (ref 32.6–36.4)
MCV RBC AUTO: 92.4 FL (ref 79.8–96.2)
PLATELET # BLD AUTO: 112 10*3/MM3 (ref 140–500)
PMV BLD AUTO: 9.4 FL (ref 6–12)
POTASSIUM BLD-SCNC: 4.8 MMOL/L (ref 3.5–5.2)
RBC # BLD AUTO: 3.68 10*6/MM3 (ref 4.6–6)
SODIUM BLD-SCNC: 140 MMOL/L (ref 136–145)
TRIGL SERPL-MCNC: 88 MG/DL (ref 0–150)
VLDLC SERPL-MCNC: 17.6 MG/DL (ref 5–40)
WBC NRBC COR # BLD: 5.67 10*3/MM3 (ref 4.5–10.7)

## 2017-09-16 PROCEDURE — 94799 UNLISTED PULMONARY SVC/PX: CPT

## 2017-09-16 PROCEDURE — 80061 LIPID PANEL: CPT | Performed by: SPECIALIST

## 2017-09-16 PROCEDURE — 85027 COMPLETE CBC AUTOMATED: CPT | Performed by: HOSPITALIST

## 2017-09-16 PROCEDURE — 82962 GLUCOSE BLOOD TEST: CPT

## 2017-09-16 PROCEDURE — 80048 BASIC METABOLIC PNL TOTAL CA: CPT | Performed by: HOSPITALIST

## 2017-09-16 RX ADMIN — MONTELUKAST 10 MG: 10 TABLET, FILM COATED ORAL at 20:54

## 2017-09-16 RX ADMIN — METFORMIN HYDROCHLORIDE 500 MG: 500 TABLET ORAL at 17:35

## 2017-09-16 RX ADMIN — RISPERIDONE 1 MG: 1 TABLET, FILM COATED ORAL at 08:23

## 2017-09-16 RX ADMIN — LEVOTHYROXINE SODIUM 25 MCG: 25 TABLET ORAL at 06:50

## 2017-09-16 RX ADMIN — SERTRALINE 50 MG: 50 TABLET, FILM COATED ORAL at 08:23

## 2017-09-16 RX ADMIN — RISPERIDONE 1 MG: 1 TABLET, FILM COATED ORAL at 17:35

## 2017-09-16 RX ADMIN — MEMANTINE HYDROCHLORIDE 10 MG: 10 TABLET, FILM COATED ORAL at 08:23

## 2017-09-16 RX ADMIN — BUDESONIDE AND FORMOTEROL FUMARATE DIHYDRATE 2 PUFF: 80; 4.5 AEROSOL RESPIRATORY (INHALATION) at 11:11

## 2017-09-16 RX ADMIN — ASPIRIN 325 MG: 325 TABLET ORAL at 08:23

## 2017-09-16 RX ADMIN — METFORMIN HYDROCHLORIDE 500 MG: 500 TABLET ORAL at 08:23

## 2017-09-16 RX ADMIN — CHOLECALCIFEROL TAB 10 MCG (400 UNIT) 400 UNITS: 10 TAB at 08:23

## 2017-09-16 RX ADMIN — PANTOPRAZOLE SODIUM 40 MG: 40 TABLET, DELAYED RELEASE ORAL at 08:23

## 2017-09-16 RX ADMIN — BUDESONIDE AND FORMOTEROL FUMARATE DIHYDRATE 2 PUFF: 80; 4.5 AEROSOL RESPIRATORY (INHALATION) at 19:46

## 2017-09-16 RX ADMIN — TAMSULOSIN HYDROCHLORIDE 0.4 MG: 0.4 CAPSULE ORAL at 20:54

## 2017-09-16 NOTE — PLAN OF CARE
Problem:  Patient Care Overview (Adult)  Goal: Plan of Care Review  Outcome: Ongoing (interventions implemented as appropriate)    09/16/17 0407   Coping/Psychosocial Response Interventions   Plan Of Care Reviewed With patient   Patient Care Overview   Progress improving   Outcome Evaluation   Outcome Summary/Follow up Plan The patient has been calm and resting well this shift. He was adherent to medication regimen. His affect is flat/depressed, but the pt denies depression, anxiety, SI, HI, or hallucinations. When the subject of his wife's passing came up, he did state that he has taken that hard. He denies any pain, SOA, or other discomfort. Good output from moses catheter. Pt overall cooperative with care.    Coping/Psychosocial   Patient Agreement with Plan of Care agrees       Goal: Individualization and Mutuality  Outcome: Ongoing (interventions implemented as appropriate)    09/16/17 0407   Behavioral Health Screens   Patient Personal Strengths family/social support   Mutuality/Individual Preferences   What Information Would Help Us Give You More Personalized Care? Pt is a ---lost wife of 65 yrs, 2 yrs ago          Problem:  Overarching Goals  Goal: Adheres to Safety Considerations for Self and Others  Intervention: Develop/maintain Individualized Safety Plan    09/16/17 0407   Provide Emotional/Physical Safety   Suicidal Ideation no   Willingness to Contact Staff Member if Feeling Like Hurting Self yes   Mental Health Homicide Risk   Homicidal Ideation no   Willingness to Contact Staff Member if Feeling Like Hurting Others yes   Safety   Safety Measures safety rounds completed;suicide check-in completed         Goal: Optimized Coping Skills in Response to Life Stressors  Intervention: Promote Effective Coping Strategies    09/16/17 0407   Coping Strategies   Supportive Measures verbalization of feelings encouraged;positive reinforcement provided;active listening utilized

## 2017-09-16 NOTE — PROGRESS NOTES
The patient seems brighter today and is not reporting any suicidal ideation.  We will continue to observe for safety, but I will discontinue the patient's suicide precautions given this development.  Neuropsychological evaluation should occur tomorrow and we will attempt to move towards disposition.

## 2017-09-16 NOTE — PLAN OF CARE
Problem:  Patient Care Overview (Adult)  Goal: Plan of Care Review  Outcome: Ongoing (interventions implemented as appropriate)    09/16/17 0820 09/16/17 1537   Coping/Psychosocial Response Interventions   Plan Of Care Reviewed With patient --    Outcome Evaluation   Outcome Summary/Follow up Plan --  Pt has been pleasant and cooperative with care this shift. Appears to be in brighter spirits. Pt did state that he wished he had not voiced SI to family member. Pt denied SI/HI, anxiety, depression, hallucinations, and pain. Pt is not oriented to situation. Compliant with medications and eating well. Pt continues to have good output from moses catheter. Will continue to monitor and provide support.   Coping/Psychosocial   Patient Agreement with Plan of Care agrees --        Goal: Interdisciplinary Rounds/Family Conference  Outcome: Ongoing (interventions implemented as appropriate)  Goal: Individualization and Mutuality  Outcome: Ongoing (interventions implemented as appropriate)  Goal: Discharge Needs Assessment  Outcome: Ongoing (interventions implemented as appropriate)    Problem:  Overarching Goals  Goal: Adheres to Safety Considerations for Self and Others  Outcome: Ongoing (interventions implemented as appropriate)  Intervention: Develop/maintain Individualized Safety Plan    09/16/17 0820 09/16/17 1400   Provide Emotional/Physical Safety   Suicidal Ideation no --    Willingness to Contact Staff Member if Feeling Like Hurting Self yes --    Mental Health Homicide Risk   Homicidal Ideation no --    Willingness to Contact Staff Member if Feeling Like Hurting Others yes --    Safety   Safety Measures --  safety rounds completed         Goal: Optimized Coping Skills in Response to Life Stressors  Outcome: Ongoing (interventions implemented as appropriate)  Intervention: Promote Effective Coping Strategies    09/16/17 0820   Coping Strategies   Supportive Measures active listening utilized;verbalization of  feelings encouraged         Goal: Develops/Participates in Therapeutic [place] to Support Successful Transition  Outcome: Ongoing (interventions implemented as appropriate)  Intervention: Foster Therapeutic [place]    09/16/17 0820   Coping Strategies   Trust Relationship/Rapport care explained;choices provided;emotional support provided;empathic listening provided;questions answered;reassurance provided;questions encouraged;thoughts/feelings acknowledged       Intervention: Mutually Develop Transition Plan    09/16/17 0820   OTHER   Transition Support crisis management plan promoted

## 2017-09-17 LAB
GLUCOSE BLDC GLUCOMTR-MCNC: 100 MG/DL (ref 70–130)
GLUCOSE BLDC GLUCOMTR-MCNC: 103 MG/DL (ref 70–130)
GLUCOSE BLDC GLUCOMTR-MCNC: 112 MG/DL (ref 70–130)
GLUCOSE BLDC GLUCOMTR-MCNC: 241 MG/DL (ref 70–130)

## 2017-09-17 PROCEDURE — 82962 GLUCOSE BLOOD TEST: CPT

## 2017-09-17 PROCEDURE — 63710000001 INSULIN ASPART PER 5 UNITS: Performed by: HOSPITALIST

## 2017-09-17 PROCEDURE — 94799 UNLISTED PULMONARY SVC/PX: CPT

## 2017-09-17 RX ADMIN — INSULIN ASPART 3 UNITS: 100 INJECTION, SOLUTION INTRAVENOUS; SUBCUTANEOUS at 21:50

## 2017-09-17 RX ADMIN — BUDESONIDE AND FORMOTEROL FUMARATE DIHYDRATE 2 PUFF: 80; 4.5 AEROSOL RESPIRATORY (INHALATION) at 19:51

## 2017-09-17 RX ADMIN — PANTOPRAZOLE SODIUM 40 MG: 40 TABLET, DELAYED RELEASE ORAL at 08:05

## 2017-09-17 RX ADMIN — MEMANTINE HYDROCHLORIDE 10 MG: 10 TABLET, FILM COATED ORAL at 08:05

## 2017-09-17 RX ADMIN — ASPIRIN 325 MG: 325 TABLET ORAL at 08:05

## 2017-09-17 RX ADMIN — RISPERIDONE 1 MG: 1 TABLET, FILM COATED ORAL at 08:06

## 2017-09-17 RX ADMIN — SERTRALINE 50 MG: 50 TABLET, FILM COATED ORAL at 08:06

## 2017-09-17 RX ADMIN — TAMSULOSIN HYDROCHLORIDE 0.4 MG: 0.4 CAPSULE ORAL at 21:50

## 2017-09-17 RX ADMIN — MONTELUKAST 10 MG: 10 TABLET, FILM COATED ORAL at 21:50

## 2017-09-17 RX ADMIN — RISPERIDONE 1 MG: 1 TABLET, FILM COATED ORAL at 18:40

## 2017-09-17 RX ADMIN — METFORMIN HYDROCHLORIDE 500 MG: 500 TABLET ORAL at 08:05

## 2017-09-17 RX ADMIN — BUDESONIDE AND FORMOTEROL FUMARATE DIHYDRATE 2 PUFF: 80; 4.5 AEROSOL RESPIRATORY (INHALATION) at 07:14

## 2017-09-17 RX ADMIN — METFORMIN HYDROCHLORIDE 500 MG: 500 TABLET ORAL at 18:05

## 2017-09-17 RX ADMIN — CHOLECALCIFEROL TAB 10 MCG (400 UNIT) 400 UNITS: 10 TAB at 08:06

## 2017-09-17 RX ADMIN — LEVOTHYROXINE SODIUM 25 MCG: 25 TABLET ORAL at 09:25

## 2017-09-17 NOTE — PROGRESS NOTES
Neuropsych testing completed.  Patient was alert, oriented, and cooperative.  He denied suicidal ideation and stated that his granddaughter had overreacted to what he had said previously.  The man did acknowledge appropriate grief regarding the loss of his wife, three years ago, and empathy for his daughter's recent loss of her .  Regarding cognitive functioning, the gentleman exhibited significant verbal memory deficits, though recognition was intact, indicating that encoding did take place, suggesting a retrieval deficit (frontal lobe).  Results were also Impaired on other measures of executive functioning.  Visuospatial memory was excellent.  Verbal and visuospatial abilities were generally intact.  Results are most likely due to small vessel ischemic disease and warrant a diagnosis of  Vascular Dementia, Mild Severity.  Complete report to follow.

## 2017-09-17 NOTE — PLAN OF CARE
Problem:  Patient Care Overview (Adult)  Goal: Plan of Care Review  Outcome: Ongoing (interventions implemented as appropriate)    09/17/17 0805 09/17/17 1633   Coping/Psychosocial Response Interventions   Plan Of Care Reviewed With patient --    Outcome Evaluation   Outcome Summary/Follow up Plan --  pt pleasant and cooperative with care this shift. Pt denies SI/HI, anxiety, depression, and pain. Compliant with medications. Neuropsych testing was done this AM and awaiting official results. Pt denied hallucinations in AM. At 1315, pt stated that there was a man in his bathroom. Pt has a history of hallucinations per family. Pt currently reading in bed. Will continue to monitor and provide support.   Coping/Psychosocial   Patient Agreement with Plan of Care agrees --        Goal: Interdisciplinary Rounds/Family Conference  Outcome: Ongoing (interventions implemented as appropriate)  Goal: Individualization and Mutuality  Outcome: Ongoing (interventions implemented as appropriate)  Goal: Discharge Needs Assessment  Outcome: Ongoing (interventions implemented as appropriate)    Problem:  Overarching Goals  Goal: Adheres to Safety Considerations for Self and Others  Outcome: Ongoing (interventions implemented as appropriate)  Intervention: Develop/maintain Individualized Safety Plan    09/17/17 0805 09/17/17 1600   Provide Emotional/Physical Safety   Suicidal Ideation no --    Willingness to Contact Staff Member if Feeling Like Hurting Self yes --    Mental Health Homicide Risk   Homicidal Ideation no --    Willingness to Contact Staff Member if Feeling Like Hurting Others yes --    Safety   Safety Measures --  safety rounds completed         Goal: Optimized Coping Skills in Response to Life Stressors  Outcome: Ongoing (interventions implemented as appropriate)  Intervention: Promote Effective Coping Strategies    09/17/17 0805   Coping Strategies   Supportive Measures active listening utilized;verbalization of  feelings encouraged;self-reflection promoted         Goal: Develops/Participates in Therapeutic [place] to Support Successful Transition  Outcome: Ongoing (interventions implemented as appropriate)  Intervention: Foster Therapeutic [place]    09/17/17 0805   Coping Strategies   Trust Relationship/Rapport care explained;choices provided;emotional support provided;empathic listening provided;questions answered;reassurance provided;questions encouraged;thoughts/feelings acknowledged       Intervention: Mutually Develop Transition Plan    09/17/17 0805   OTHER   Transition Support crisis management plan promoted

## 2017-09-17 NOTE — PROGRESS NOTES
The results of the patient's neuropsychological evaluation are reviewed and deeply appreciated.  In the meantime, the patient is pleasant and cooperative and is denying any suicidal ideation.  We will look to begin disposition planning in the next day or so.

## 2017-09-17 NOTE — PLAN OF CARE
Problem:  Patient Care Overview (Adult)  Goal: Plan of Care Review  Outcome: Ongoing (interventions implemented as appropriate)    09/16/17 0407 09/17/17 0533 09/17/17 0653   Coping/Psychosocial Response Interventions   Plan Of Care Reviewed With --  patient --    Patient Care Overview   Progress improving --  --    Outcome Evaluation   Outcome Summary/Follow up Plan --  --  Denied anxiety, depression, SI/HI, and hallucinations. Cooperative and med compliant. Continue to monitor and assess.    Coping/Psychosocial   Patient Agreement with Plan of Care --  agrees --        Goal: Interdisciplinary Rounds/Family Conference  Outcome: Ongoing (interventions implemented as appropriate)  Goal: Individualization and Mutuality  Outcome: Ongoing (interventions implemented as appropriate)  Goal: Discharge Needs Assessment  Outcome: Ongoing (interventions implemented as appropriate)    Problem:  Overarching Goals  Goal: Adheres to Safety Considerations for Self and Others  Outcome: Ongoing (interventions implemented as appropriate)  Intervention: Develop/maintain Individualized Safety Plan    09/17/17 0533   Provide Emotional/Physical Safety   Suicidal Ideation no   Willingness to Contact Staff Member if Feeling Like Hurting Self yes   Mental Health Homicide Risk   Homicidal Ideation no   Willingness to Contact Staff Member if Feeling Like Hurting Others yes   Safety   Safety Measures suicide assessment completed;safety rounds completed         Goal: Optimized Coping Skills in Response to Life Stressors  Outcome: Ongoing (interventions implemented as appropriate)  Intervention: Promote Effective Coping Strategies    09/17/17 0533   Coping Strategies   Supportive Measures active listening utilized;verbalization of feelings encouraged;self-responsibility promoted;self-reflection promoted;self-care encouraged         Goal: Develops/Participates in Therapeutic [place] to Support Successful Transition  Outcome: Ongoing  (interventions implemented as appropriate)  Intervention: Mutually Develop Transition Plan    09/17/17 7185   OTHER   Transition Support crisis management plan promoted

## 2017-09-18 LAB
GLUCOSE BLDC GLUCOMTR-MCNC: 110 MG/DL (ref 70–130)
GLUCOSE BLDC GLUCOMTR-MCNC: 113 MG/DL (ref 70–130)
GLUCOSE BLDC GLUCOMTR-MCNC: 93 MG/DL (ref 70–130)
GLUCOSE BLDC GLUCOMTR-MCNC: 99 MG/DL (ref 70–130)

## 2017-09-18 PROCEDURE — 94799 UNLISTED PULMONARY SVC/PX: CPT

## 2017-09-18 PROCEDURE — 82962 GLUCOSE BLOOD TEST: CPT

## 2017-09-18 RX ORDER — RISPERIDONE 1 MG/1
1 TABLET ORAL 3 TIMES DAILY
Status: DISCONTINUED | OUTPATIENT
Start: 2017-09-18 | End: 2017-09-21 | Stop reason: HOSPADM

## 2017-09-18 RX ADMIN — PANTOPRAZOLE SODIUM 40 MG: 40 TABLET, DELAYED RELEASE ORAL at 08:10

## 2017-09-18 RX ADMIN — MEMANTINE HYDROCHLORIDE 10 MG: 10 TABLET, FILM COATED ORAL at 08:10

## 2017-09-18 RX ADMIN — LEVOTHYROXINE SODIUM 25 MCG: 25 TABLET ORAL at 08:09

## 2017-09-18 RX ADMIN — ASPIRIN 325 MG: 325 TABLET ORAL at 08:10

## 2017-09-18 RX ADMIN — TAMSULOSIN HYDROCHLORIDE 0.4 MG: 0.4 CAPSULE ORAL at 20:57

## 2017-09-18 RX ADMIN — MONTELUKAST 10 MG: 10 TABLET, FILM COATED ORAL at 20:57

## 2017-09-18 RX ADMIN — RISPERIDONE 1 MG: 1 TABLET, FILM COATED ORAL at 20:57

## 2017-09-18 RX ADMIN — SERTRALINE 50 MG: 50 TABLET, FILM COATED ORAL at 08:11

## 2017-09-18 RX ADMIN — CHOLECALCIFEROL TAB 10 MCG (400 UNIT) 400 UNITS: 10 TAB at 08:11

## 2017-09-18 RX ADMIN — METFORMIN HYDROCHLORIDE 500 MG: 500 TABLET ORAL at 18:12

## 2017-09-18 RX ADMIN — BUDESONIDE AND FORMOTEROL FUMARATE DIHYDRATE 2 PUFF: 80; 4.5 AEROSOL RESPIRATORY (INHALATION) at 07:32

## 2017-09-18 RX ADMIN — RISPERIDONE 1 MG: 1 TABLET, FILM COATED ORAL at 08:10

## 2017-09-18 RX ADMIN — BUDESONIDE AND FORMOTEROL FUMARATE DIHYDRATE 2 PUFF: 80; 4.5 AEROSOL RESPIRATORY (INHALATION) at 19:54

## 2017-09-18 RX ADMIN — RISPERIDONE 1 MG: 1 TABLET, FILM COATED ORAL at 16:26

## 2017-09-18 RX ADMIN — METFORMIN HYDROCHLORIDE 500 MG: 500 TABLET ORAL at 08:09

## 2017-09-18 NOTE — PLAN OF CARE
Problem:  Patient Care Overview (Adult)  Goal: Plan of Care Review  Outcome: Ongoing (interventions implemented as appropriate)    09/18/17 0809 09/18/17 1712   Coping/Psychosocial Response Interventions   Plan Of Care Reviewed With patient --    Outcome Evaluation   Outcome Summary/Follow up Plan --  Pt was pleasant and cooperative with care this shift. Compliant with medications. Pt denied SI/HI, anxiety, depression and pain. Pt continues to state that he sees a man in the bathroom. Pt's family who visited in afternoon stated that pt was seeing men outside of the window. Per pt's family this has been common for many years. Will continue to monitor and provide support.   Coping/Psychosocial   Patient Agreement with Plan of Care agrees --        Goal: Interdisciplinary Rounds/Family Conference  Outcome: Ongoing (interventions implemented as appropriate)  Goal: Individualization and Mutuality  Outcome: Ongoing (interventions implemented as appropriate)  Goal: Discharge Needs Assessment  Outcome: Ongoing (interventions implemented as appropriate)    Problem:  Overarching Goals  Goal: Adheres to Safety Considerations for Self and Others  Outcome: Ongoing (interventions implemented as appropriate)  Intervention: Develop/maintain Individualized Safety Plan    09/18/17 0809 09/18/17 1600   Provide Emotional/Physical Safety   Suicidal Ideation no --    Willingness to Contact Staff Member if Feeling Like Hurting Self yes --    Mental Health Homicide Risk   Homicidal Ideation no --    Willingness to Contact Staff Member if Feeling Like Hurting Others yes --    Safety   Safety Measures --  safety rounds completed         Goal: Optimized Coping Skills in Response to Life Stressors  Outcome: Ongoing (interventions implemented as appropriate)  Intervention: Promote Effective Coping Strategies    09/18/17 0809   Coping Strategies   Supportive Measures active listening utilized;verbalization of feelings encouraged;self-care  encouraged;positive reinforcement provided         Goal: Develops/Participates in Therapeutic [place] to Support Successful Transition  Outcome: Ongoing (interventions implemented as appropriate)  Intervention: Foster Therapeutic [place]    09/18/17 0809   Coping Strategies   Trust Relationship/Rapport care explained;choices provided;emotional support provided;empathic listening provided;reassurance provided;questions encouraged;questions answered;thoughts/feelings acknowledged       Intervention: Mutually Develop Transition Plan    09/18/17 0809   OTHER   Transition Support crisis management plan promoted

## 2017-09-18 NOTE — PROGRESS NOTES
The patient is generally pleasant and cooperative with care.  He did report some visual hallucinations yesterday, and family reports this is been, and for him.  I will go ahead and increase the patient's Risperdal to 1 mg 3 times daily in hopes of addressing his ongoing psychotic symptoms.  I have also learned that the patient's daughter plans on taking the patient home at the time of discharge.  Patient's currently denying any suicidal ideation.

## 2017-09-18 NOTE — PLAN OF CARE
Problem:  Patient Care Overview (Adult)  Goal: Plan of Care Review  Outcome: Ongoing (interventions implemented as appropriate)    09/18/17 0500 09/18/17 0553   Coping/Psychosocial Response Interventions   Plan Of Care Reviewed With patient --    Patient Care Overview   Progress --  improving   Outcome Evaluation   Outcome Summary/Follow up Plan --  Denied anxiety, depression, SI/HI, and hallucinations. Pt stayed in room throughout shift. Cooperative and med compliant. Continue to monitor and assess.    Coping/Psychosocial   Patient Agreement with Plan of Care agrees --       Goal: Interdisciplinary Rounds/Family Conference  Outcome: Ongoing (interventions implemented as appropriate)  Goal: Individualization and Mutuality  Outcome: Ongoing (interventions implemented as appropriate)  Goal: Discharge Needs Assessment  Outcome: Ongoing (interventions implemented as appropriate)    Problem:  Overarching Goals  Goal: Adheres to Safety Considerations for Self and Others  Outcome: Ongoing (interventions implemented as appropriate)  Intervention: Develop/maintain Individualized Safety Plan    09/18/17 0500   Provide Emotional/Physical Safety   Suicidal Ideation no   Willingness to Contact Staff Member if Feeling Like Hurting Self yes   Mental Health Homicide Risk   Homicidal Ideation no   Willingness to Contact Staff Member if Feeling Like Hurting Others yes   Safety   Safety Measures safety rounds completed;suicide assessment completed         Goal: Optimized Coping Skills in Response to Life Stressors  Outcome: Ongoing (interventions implemented as appropriate)  Intervention: Promote Effective Coping Strategies    09/18/17 0500   Coping Strategies   Supportive Measures active listening utilized;positive reinforcement provided;self-care encouraged;self-reflection promoted;self-responsibility promoted;verbalization of feelings encouraged         Goal: Develops/Participates in Therapeutic [place] to Support Successful  Transition  Outcome: Ongoing (interventions implemented as appropriate)  Intervention: Foster Therapeutic [place]    09/18/17 0500   Coping Strategies   Trust Relationship/Rapport care explained;choices provided;emotional support provided;empathic listening provided;questions answered;questions encouraged;reassurance provided;thoughts/feelings acknowledged       Intervention: Mutually Develop Transition Plan    09/18/17 0500   OTHER   Transition Support crisis management plan promoted

## 2017-09-19 LAB
GLUCOSE BLDC GLUCOMTR-MCNC: 107 MG/DL (ref 70–130)
GLUCOSE BLDC GLUCOMTR-MCNC: 115 MG/DL (ref 70–130)
GLUCOSE BLDC GLUCOMTR-MCNC: 139 MG/DL (ref 70–130)
GLUCOSE BLDC GLUCOMTR-MCNC: 96 MG/DL (ref 70–130)

## 2017-09-19 PROCEDURE — 82962 GLUCOSE BLOOD TEST: CPT

## 2017-09-19 PROCEDURE — 94799 UNLISTED PULMONARY SVC/PX: CPT

## 2017-09-19 RX ADMIN — PANTOPRAZOLE SODIUM 40 MG: 40 TABLET, DELAYED RELEASE ORAL at 08:26

## 2017-09-19 RX ADMIN — LEVOTHYROXINE SODIUM 25 MCG: 25 TABLET ORAL at 06:42

## 2017-09-19 RX ADMIN — CHOLECALCIFEROL TAB 10 MCG (400 UNIT) 400 UNITS: 10 TAB at 08:27

## 2017-09-19 RX ADMIN — BUDESONIDE AND FORMOTEROL FUMARATE DIHYDRATE 2 PUFF: 80; 4.5 AEROSOL RESPIRATORY (INHALATION) at 09:30

## 2017-09-19 RX ADMIN — ASPIRIN 325 MG: 325 TABLET ORAL at 08:26

## 2017-09-19 RX ADMIN — RISPERIDONE 1 MG: 1 TABLET, FILM COATED ORAL at 20:22

## 2017-09-19 RX ADMIN — METFORMIN HYDROCHLORIDE 500 MG: 500 TABLET ORAL at 17:26

## 2017-09-19 RX ADMIN — TAMSULOSIN HYDROCHLORIDE 0.4 MG: 0.4 CAPSULE ORAL at 20:22

## 2017-09-19 RX ADMIN — RISPERIDONE 1 MG: 1 TABLET, FILM COATED ORAL at 08:27

## 2017-09-19 RX ADMIN — SERTRALINE 50 MG: 50 TABLET, FILM COATED ORAL at 08:26

## 2017-09-19 RX ADMIN — RISPERIDONE 1 MG: 1 TABLET, FILM COATED ORAL at 15:35

## 2017-09-19 RX ADMIN — MEMANTINE HYDROCHLORIDE 10 MG: 10 TABLET, FILM COATED ORAL at 08:26

## 2017-09-19 RX ADMIN — METFORMIN HYDROCHLORIDE 500 MG: 500 TABLET ORAL at 08:26

## 2017-09-19 RX ADMIN — MONTELUKAST 10 MG: 10 TABLET, FILM COATED ORAL at 20:22

## 2017-09-19 RX ADMIN — BUDESONIDE AND FORMOTEROL FUMARATE DIHYDRATE 2 PUFF: 80; 4.5 AEROSOL RESPIRATORY (INHALATION) at 20:24

## 2017-09-19 NOTE — PLAN OF CARE
Problem:  Patient Care Overview (Adult)  Goal: Plan of Care Review  Outcome: Ongoing (interventions implemented as appropriate)    09/19/17 0516   Coping/Psychosocial Response Interventions   Plan Of Care Reviewed With patient   Outcome Evaluation   Outcome Summary/Follow up Plan Pt alert and oriented X 4. Pt compliant with meds. Pt denied anxiety, depression, SI, HI, and hallucinations. Pt appeared to rest well throughout the night with no issues noted.. Will continue to monitor.    Coping/Psychosocial   Patient Agreement with Plan of Care agrees         Problem:  Overarching Goals  Goal: Adheres to Safety Considerations for Self and Others  Outcome: Ongoing (interventions implemented as appropriate)    09/19/17 0516   Overarching Goals   Adheres to Safety Considerations Promote/provide immediate and ongoing protective physical environment. Provide enviornmental rounds/enviornment of care safety checks at the change of shift and situational/prn as needed.        Goal: Optimized Coping Skills in Response to Life Stressors  Outcome: Ongoing (interventions implemented as appropriate)    09/19/17 0516   Overarching Goals   Optimized Coping Skills Identify current effective/ineffective coping strategies. Assist with developing/using positive coping strategies.        Goal: Develops/Participates in Therapeutic [place] to Support Successful Transition  Outcome: Ongoing (interventions implemented as appropriate)    09/19/17 0516   Patient Care Overview   Develop/Participate Therapeutic [place] Establish rapport; develop trust relationship. Provide emotional support.

## 2017-09-19 NOTE — PLAN OF CARE
Problem: [facility] Patient Care Overview (Adult)  Goal: Interdisciplinary Rounds/Family Conference  Outcome: Ongoing (interventions implemented as appropriate)    09/19/17 0936   Interdisciplinary Rounds/Family Conf   Summary Treatment team met to discuss plan of care. Plan for sister to take patient home at discharge and hire a 24 hour caregiver.   Participants art therapy;;nursing;psychiatrist;social work      Patient/Guardian Signature: __________________________________             Psychiatrist Signature: ______________________________________             Therapist Signature: ________________________________________              Nurse Signature: ___________________________________________              Staff Signature: ____________________________________________             Staff Signature: ____________________________________________              Staff Signature: ____________________________________________              Staff Signature:                                                                                                      Goal: Individualization and Mutuality  Outcome: Ongoing (interventions implemented as appropriate)    Problem: Alteration in Thoughts and Perception  Goal: Treatment Goal: Gain control of psychotic behaviors/thinking, reduce/eliminate presenting symptoms and demonstrate improved reality functioning upon discharge  Outcome: Ongoing (interventions implemented as appropriate)  Goal: Verbalize thoughts and feelings associated with:  Outcome: Ongoing (interventions implemented as appropriate)  Psychosis  Goal: Refrain from acting on delusional thinking/internal stimuli  Outcome: Ongoing (interventions implemented as appropriate)  Goal: Agree to be compliant with medication regime, as prescribed and report medication side effects  Outcome: Ongoing (interventions implemented as appropriate)  Goal: Attend and participate in unit activities, including therapeutic, recreational, and  educational groups  Outcome: Ongoing (interventions implemented as appropriate)  Goal: Recognize dysfunctional thoughts, communicate reality-based thoughts at the time of discharge  Outcome: Ongoing (interventions implemented as appropriate)  Goal: Complete daily ADLs, including personal hygiene independently, as able  Outcome: Ongoing (interventions implemented as appropriate)    Problem: Thought Process Alteration (Adult)  Goal: Improved Thought Process  Outcome: Ongoing (interventions implemented as appropriate)    09/19/17 0959   Thought Process Alteration (Adult)   Improved Thought Process making progress toward outcome         Problem: Risk for Self Injury/Neglect  Goal: Treatment Goal: Remain safe during length of stay, learn and adopt new coping skills, and be free of self-injurious ideation, impulses and acts at the time of discharge  Outcome: Ongoing (interventions implemented as appropriate)  Goal: Verbalize thoughts and feelings associated with:  Outcome: Ongoing (interventions implemented as appropriate)  Goal: Refrain from harming self  Outcome: Ongoing (interventions implemented as appropriate)  Goal: Attend and participate in unit activities, including therapeutic, recreational, and educational groups  Outcome: Ongoing (interventions implemented as appropriate)  Goal: Recognize maladaptive responses and adopt new coping mechanisms  Outcome: Ongoing (interventions implemented as appropriate)  Goal: Complete daily ADLs, including personal hygiene independently, as able  Outcome: Ongoing (interventions implemented as appropriate)

## 2017-09-19 NOTE — PROGRESS NOTES
Patient offers no new complaints today.  Staff and family reports that he has continued to experience visual hallucinations but reports that this is been going on for some time.  I will plan on calling the patient's daughter to update her on the situation as the patient appears to be approaching her psychiatric baseline at this point.    Addendum: I have left a message for the patient's daughter but was unsuccessful in reaching her per telephone.

## 2017-09-19 NOTE — PLAN OF CARE
"Problem:  Patient Care Overview (Adult)  Goal: Plan of Care Review  Outcome: Ongoing (interventions implemented as appropriate)    09/19/17 1200 09/19/17 1603   Coping/Psychosocial Response Interventions   Plan Of Care Reviewed With patient --    Patient Care Overview   Progress --  improving   Outcome Evaluation   Outcome Summary/Follow up Plan --  Pt is compliant with medications and cooperative with care. Pt reports mild depression based on his desire to go home. Pt denies SI, HI and anxiety. Pt reports seeing a man in the bathroom and the man's shoes under the bathroom curtain. He stated \"cant you see him?\" and nurse stated no. He has napped off and on today . Will continue to monitor pt for safey and any change in condition.   Coping/Psychosocial   Patient Agreement with Plan of Care agrees --          Problem:  Overarching Goals  Goal: Adheres to Safety Considerations for Self and Others  Outcome: Ongoing (interventions implemented as appropriate)  Intervention: Develop/maintain Individualized Safety Plan    09/19/17 1200 09/19/17 1400   Provide Emotional/Physical Safety   Suicidal Ideation no --    Willingness to Contact Staff Member if Feeling Like Hurting Self yes --    Mental Health Homicide Risk   Homicidal Ideation no --    Willingness to Contact Staff Member if Feeling Like Hurting Others yes --    Safety   Safety Measures --  safety rounds completed         Goal: Optimized Coping Skills in Response to Life Stressors  Outcome: Ongoing (interventions implemented as appropriate)  Goal: Develops/Participates in Therapeutic [place] to Support Successful Transition  Outcome: Ongoing (interventions implemented as appropriate)  Intervention: Foster Therapeutic [place]    09/19/17 1200   Coping Strategies   Trust Relationship/Rapport care explained;choices provided;questions encouraged;thoughts/feelings acknowledged       Intervention: Mutually Develop Transition Plan    09/19/17 1200   OTHER   Transition " Support crisis management plan promoted

## 2017-09-20 LAB
GLUCOSE BLDC GLUCOMTR-MCNC: 130 MG/DL (ref 70–130)
GLUCOSE BLDC GLUCOMTR-MCNC: 96 MG/DL (ref 70–130)
GLUCOSE BLDC GLUCOMTR-MCNC: 98 MG/DL (ref 70–130)

## 2017-09-20 PROCEDURE — 82962 GLUCOSE BLOOD TEST: CPT

## 2017-09-20 PROCEDURE — 94799 UNLISTED PULMONARY SVC/PX: CPT

## 2017-09-20 PROCEDURE — 25010000002 ONDANSETRON PER 1 MG: Performed by: SPECIALIST

## 2017-09-20 RX ORDER — ONDANSETRON 2 MG/ML
4 INJECTION INTRAMUSCULAR; INTRAVENOUS EVERY 6 HOURS PRN
Status: DISCONTINUED | OUTPATIENT
Start: 2017-09-20 | End: 2017-09-21 | Stop reason: HOSPADM

## 2017-09-20 RX ORDER — ONDANSETRON 4 MG/1
4 TABLET, FILM COATED ORAL EVERY 6 HOURS PRN
Status: DISCONTINUED | OUTPATIENT
Start: 2017-09-20 | End: 2017-09-21 | Stop reason: HOSPADM

## 2017-09-20 RX ORDER — ONDANSETRON 2 MG/ML
4 INJECTION INTRAMUSCULAR; INTRAVENOUS EVERY 6 HOURS PRN
Status: DISCONTINUED | OUTPATIENT
Start: 2017-09-20 | End: 2017-09-20

## 2017-09-20 RX ADMIN — LEVOTHYROXINE SODIUM 25 MCG: 25 TABLET ORAL at 06:58

## 2017-09-20 RX ADMIN — MEMANTINE HYDROCHLORIDE 10 MG: 10 TABLET, FILM COATED ORAL at 09:02

## 2017-09-20 RX ADMIN — SERTRALINE 50 MG: 50 TABLET, FILM COATED ORAL at 09:03

## 2017-09-20 RX ADMIN — RISPERIDONE 1 MG: 1 TABLET, FILM COATED ORAL at 09:03

## 2017-09-20 RX ADMIN — METFORMIN HYDROCHLORIDE 500 MG: 500 TABLET ORAL at 17:27

## 2017-09-20 RX ADMIN — PANTOPRAZOLE SODIUM 40 MG: 40 TABLET, DELAYED RELEASE ORAL at 09:02

## 2017-09-20 RX ADMIN — TAMSULOSIN HYDROCHLORIDE 0.4 MG: 0.4 CAPSULE ORAL at 21:27

## 2017-09-20 RX ADMIN — MONTELUKAST 10 MG: 10 TABLET, FILM COATED ORAL at 21:27

## 2017-09-20 RX ADMIN — CHOLECALCIFEROL TAB 10 MCG (400 UNIT) 400 UNITS: 10 TAB at 09:03

## 2017-09-20 RX ADMIN — METFORMIN HYDROCHLORIDE 500 MG: 500 TABLET ORAL at 09:02

## 2017-09-20 RX ADMIN — ONDANSETRON 4 MG: 2 INJECTION INTRAMUSCULAR; INTRAVENOUS at 20:51

## 2017-09-20 RX ADMIN — RISPERIDONE 1 MG: 1 TABLET, FILM COATED ORAL at 17:27

## 2017-09-20 RX ADMIN — RISPERIDONE 1 MG: 1 TABLET, FILM COATED ORAL at 21:27

## 2017-09-20 RX ADMIN — BUDESONIDE AND FORMOTEROL FUMARATE DIHYDRATE 2 PUFF: 80; 4.5 AEROSOL RESPIRATORY (INHALATION) at 08:30

## 2017-09-20 RX ADMIN — ASPIRIN 325 MG: 325 TABLET ORAL at 09:02

## 2017-09-20 NOTE — PLAN OF CARE
Problem: [facility] Patient Care Overview (Adult)  Goal: Plan of Care Review  Outcome: Ongoing (interventions implemented as appropriate)    09/20/17 0530   Patient Care Overview   Progress improving   Outcome Evaluation   Outcome Summary/Follow up Plan Pt appeared to sleep well, no complaints, coop with care and staff, no hallucinations reported or noted, .         Problem: Fall Risk (Adult)  Goal: Absence of Falls  Outcome: Ongoing (interventions implemented as appropriate)

## 2017-09-20 NOTE — PROGRESS NOTES
The patient is sleeping soundly this morning.  Staff reports no management issues.  He continues to have a Gonsalez catheter in place.  We are expressing some uncertainty with the family regarding their final plans for disposition but are working with him to assure that the patient has a safe environment upon his discharge from this facility.

## 2017-09-20 NOTE — PLAN OF CARE
"Problem:  Patient Care Overview (Adult)  Goal: Plan of Care Review  Outcome: Ongoing (interventions implemented as appropriate)    09/19/17 2020 09/20/17 0530 09/20/17 1635   Coping/Psychosocial Response Interventions   Plan Of Care Reviewed With patient --  --    Patient Care Overview   Progress --  improving --    Outcome Evaluation   Outcome Summary/Follow up Plan --  --  Pt has been calm and cooperative this shift. Pt is compliant with medications. Pt denies SI, HI, anxiety and depression but does still report seeing \"a man in my bathroom but he means no harm\". Possible d/ctomorrow. Will continue to monitor for safetyand change in condition.   Coping/Psychosocial   Patient Agreement with Plan of Care agrees --  --          Problem:  Overarching Goals  Goal: Adheres to Safety Considerations for Self and Others  Outcome: Ongoing (interventions implemented as appropriate)  Intervention: Develop/maintain Individualized Safety Plan    09/20/17 1300 09/20/17 1600   Provide Emotional/Physical Safety   Suicidal Ideation no --    Willingness to Contact Staff Member if Feeling Like Hurting Self yes --    Mental Health Homicide Risk   Homicidal Ideation no --    Willingness to Contact Staff Member if Feeling Like Hurting Others yes --    Safety   Safety Measures --  safety rounds completed         Goal: Optimized Coping Skills in Response to Life Stressors  Outcome: Ongoing (interventions implemented as appropriate)  Intervention: Promote Effective Coping Strategies    09/19/17 2020   Coping Strategies   Supportive Measures active listening utilized         Goal: Develops/Participates in Therapeutic [place] to Support Successful Transition  Outcome: Ongoing (interventions implemented as appropriate)  Intervention: Foster Therapeutic [place]    09/19/17 2020   Coping Strategies   Trust Relationship/Rapport care explained;choices provided;emotional support provided;empathic listening provided;questions " answered;questions encouraged;reassurance provided;thoughts/feelings acknowledged       Intervention: Mutually Develop Transition Plan    09/19/17 2020   OTHER   Transition Support crisis management plan promoted

## 2017-09-21 VITALS
SYSTOLIC BLOOD PRESSURE: 116 MMHG | HEART RATE: 64 BPM | RESPIRATION RATE: 18 BRPM | OXYGEN SATURATION: 96 % | BODY MASS INDEX: 24.91 KG/M2 | DIASTOLIC BLOOD PRESSURE: 62 MMHG | TEMPERATURE: 97.3 F | WEIGHT: 158.7 LBS | HEIGHT: 67 IN

## 2017-09-21 LAB
GLUCOSE BLDC GLUCOMTR-MCNC: 77 MG/DL (ref 70–130)
GLUCOSE BLDC GLUCOMTR-MCNC: 92 MG/DL (ref 70–130)

## 2017-09-21 PROCEDURE — 82962 GLUCOSE BLOOD TEST: CPT

## 2017-09-21 PROCEDURE — 94799 UNLISTED PULMONARY SVC/PX: CPT

## 2017-09-21 RX ORDER — MEMANTINE HYDROCHLORIDE 10 MG/1
10 TABLET ORAL DAILY
Qty: 30 TABLET | Refills: 1 | Status: SHIPPED | OUTPATIENT
Start: 2017-09-22

## 2017-09-21 RX ORDER — RISPERIDONE 1 MG/1
1 TABLET ORAL 3 TIMES DAILY
Qty: 90 TABLET | Refills: 1 | Status: SHIPPED | OUTPATIENT
Start: 2017-09-21

## 2017-09-21 RX ADMIN — MAGNESIUM HYDROXIDE 10 ML: 2400 SUSPENSION ORAL at 05:39

## 2017-09-21 RX ADMIN — MEMANTINE HYDROCHLORIDE 10 MG: 10 TABLET, FILM COATED ORAL at 08:47

## 2017-09-21 RX ADMIN — PANTOPRAZOLE SODIUM 40 MG: 40 TABLET, DELAYED RELEASE ORAL at 09:15

## 2017-09-21 RX ADMIN — LEVOTHYROXINE SODIUM 25 MCG: 25 TABLET ORAL at 05:35

## 2017-09-21 RX ADMIN — CHOLECALCIFEROL TAB 10 MCG (400 UNIT) 400 UNITS: 10 TAB at 08:47

## 2017-09-21 RX ADMIN — RISPERIDONE 1 MG: 1 TABLET, FILM COATED ORAL at 08:47

## 2017-09-21 RX ADMIN — SERTRALINE 50 MG: 50 TABLET, FILM COATED ORAL at 08:47

## 2017-09-21 RX ADMIN — METFORMIN HYDROCHLORIDE 500 MG: 500 TABLET ORAL at 08:47

## 2017-09-21 RX ADMIN — ASPIRIN 325 MG: 325 TABLET ORAL at 08:47

## 2017-09-21 RX ADMIN — BUDESONIDE AND FORMOTEROL FUMARATE DIHYDRATE 2 PUFF: 80; 4.5 AEROSOL RESPIRATORY (INHALATION) at 08:15

## 2017-09-21 NOTE — PLAN OF CARE
Problem:  Patient Care Overview (Adult)  Goal: Plan of Care Review  Outcome: Ongoing (interventions implemented as appropriate)    09/21/17 0359   Coping/Psychosocial Response Interventions   Plan Of Care Reviewed With patient   Outcome Evaluation   Outcome Summary/Follow up Plan Pt pleasant and cooperative this shift. Pt compliant with meds. Pt alert and oriented X 4. Pt denied anxeity, depression, SI, and HI. Pt said he continues to experince visual hallucinations. Pt denies demand hallucinations. Pt experienced emesis X 1 this shift. Notified Dr. Vera. Ordered zofran po/im. Administered IM zofran. Med appeared to have been effective. No further episodes of emesis noted. Notified Dr. Jhaveri with A. Dr. Jhaveri ordered clear liquid diet, advance as tolerated. Dr. Jhaveri also ordered to keep pt away from general population at this time in case pt has a viral illness. Pt appeared to rest well remainder of night with no further issues noted. Will continue to monitor.    Coping/Psychosocial   Patient Agreement with Plan of Care agrees         Problem:  Overarching Goals  Goal: Adheres to Safety Considerations for Self and Others  Outcome: Ongoing (interventions implemented as appropriate)    09/21/17 0359   Overarching Goals   Adheres to Safety Considerations Promote/provide immediate and ongoing protective physical environment. Provide environmental rounds/enviornement of care safety checks at the change of shift and situational/prn as needed.        Goal: Optimized Coping Skills in Response to Life Stressors  Outcome: Ongoing (interventions implemented as appropriate)    09/21/17 0359   Overarching Goals   Optimized Coping Skills Identify current effective/ineffective coping strategies. Assist with developing/using positive coping skills.        Goal: Develops/Participates in Therapeutic [place] to Support Successful Transition  Outcome: Ongoing (interventions implemented as appropriate)    09/21/17 0359    Patient Care Overview   Develop/Participate Therapeutic [place] Provide emotional support. Honor confidentiality.

## 2017-09-21 NOTE — PROGRESS NOTES
Continued Stay Note  Psychiatric     Patient Name: Juliano Garzon  MRN: [id number]  Today's Date: 9/21/2017    Admit Date: 9/14/2017          Discharge Plan       09/21/17 1121    Final Note    Final Note Pt will be discharged per Dr. Vera's orders.  SW spoke w/pt's daughter, Katie Motley who stated that pt will be returning home w/a private sitter.  Daughter stated that pt already has home health svcs through [place], ph. [phone] and receives his oxygen & equiment from [facility], ph. [phone]. Daughter will call to resume home health svcs.  Also, pt's daughter stated that pt will not see a Psychiatrist so he will follow-up w/his PCP, Dr. Wally Valdez, ph. [phone].      09/21/17 0833    Case Management/Social Work Plan    Additional Comments SW received a call from Katie Motley, pt's daughter stating that they are going to get a private sitter so she will be bringing him home when he is discharged.              Discharge Codes       09/21/17 1127    Discharge Codes    Discharge Codes 01  Discharge to home        Expected Discharge Date and Time     Expected Discharge Date Expected Discharge Time    Sep 21, 2017             GALLO Rm

## 2017-09-21 NOTE — PROGRESS NOTES
Continued Stay Note  [facility]     Patient Name: Juliano Garzon  MRN: [id number]  Today's Date: 9/21/2017    Admit Date: 9/14/2017          Discharge Plan       09/21/17 1443    Case Management/Social Work Plan    Additional Comments SW rec'd a call from Katie Motley, pt's daughter stating that as soon as pt left the hospital, he is stating that he is not going to have a sitter w/him.  Pt's sister feels as if she is at a loss w/pt.  Advised pt to introduce the sitter to pt in hopes that he would change his mind.              Discharge Codes     None        Expected Discharge Date and Time     Expected Discharge Date Expected Discharge Time    Sep 21, 2017             GALLO Rm

## 2017-09-21 NOTE — DISCHARGE SUMMARY
DATES OF ADMISSION: 9/14/2017-9/21/2017    REASON FOR ADMISSION: The patient is an 87-year-old white male with a history of dementia and depression he is admitted after he had reportedly made suicidal threats and was experiencing visual hallucinations    LABS:  See chart    HOSPITAL COURSE:  The patient was admitted to the crisis management unit and placed on Monk 2 programming.  He was generally pleasant and cooperative with medications but did report some ongoing visual hallucinations.  Family reports that he has expressed these for several years.  His recent rales increased to 1 mg 3 times daily in hopes of addressing this symptom area at no point did the patient voiced any suicidal thinking.  By 9/21/2017 arrangements had been made for the patient to be living at his previous home with  24-hour care provided as well as home health.  His chart was ordered.    FINAL DIAGNOSIS:  Primary dementia Alzheimer's type with behavioral disturbance, dysthymic disorder, diabetes mellitus, COPD    DISPOSITION ON DISCHARGE:  The patient will be discharged home with home health and 24 hour care.  A full listing of his medications is provided below    PROGNOSIS: Juliano Sarabia   Home Medication Instructions EDU:[id number]    Printed on:09/21/17 1008   Medication Information                      aspirin 325 MG tablet  Take 325 mg by mouth Daily.             Fluticasone Furoate-Vilanterol (BREO ELLIPTA) 100-25 MCG/INH aerosol powder   Inhale Daily.             hydrOXYzine (ATARAX) 25 MG tablet  Take 25 mg by mouth 2 (Two) Times a Day.             levothyroxine (SYNTHROID, LEVOTHROID) 25 MCG tablet  Take 25 mcg by mouth Daily.             memantine (NAMENDA) 10 MG tablet  Take 1 tablet by mouth Daily.             metFORMIN (GLUCOPHAGE) 500 MG tablet  Take 1 tablet by mouth 2 (Two) Times a Day With Meals.             montelukast (SINGULAIR) 10 MG tablet  Take 10 mg by mouth Every Night.             pantoprazole  (PROTONIX) 40 MG EC tablet  Take 40 mg by mouth Daily.             risperiDONE (risperDAL) 1 MG tablet  Take 1 tablet by mouth 3 (Three) Times a Day.             sertraline (ZOLOFT) 50 MG tablet  Take 1 tablet by mouth Daily.             tamsulosin (FLOMAX) 0.4 MG capsule 24 hr capsule  Take 1 capsule by mouth Every Night.             Vitamin D, Cholecalciferol, (CHOLECALCIFEROL) 400 units tablet  Take 400 Units by mouth Daily.

## 2021-03-04 NOTE — H&P
IDENTIFYING INFORMATION: The patient is an 87-year-old white male admitted in transfer from the main hospital after he had reportedly made suicidal threats    CHIEF COMPLAINT:  None given    INFORMANT:  Chart    RELIABILITY:  Good    HISTORY OF PRESENT ILLNESS: Patient is an 87-year-old white male who is admitted after he had made suicidal threats during his stay in [facility].  A full consultation note was dictated on 9/14/2017 and can be consulted for a full history.    PAST PSYCHIATRIC HISTORY: Please see consultation note    PAST MEDICAL HISTORY:  Please see consultation note    MEDICATIONS: Symbicort Atarax Singulair Flomax    ALLERGIES:  None    FAMILY HISTORY:  Please see consultation note    SOCIAL HISTORY: Please see consultation note    MENTAL STATUS EXAM: Please see consultation note    ASSETS/LIABILITIES: Supportive family/advancing health issues    DIAGNOSTIC IMPRESSION: Major depressive disorder recurrent moderate, primary dementia Alzheimer's type, COPD, benign prostatic hypertrophy    PLAN:  The patient range hospitalized for safety and stabilization.  We will continue suicide precautions and one-to-one sitter for at least another 24 hours.  The patient will continue previously prescribed Zoloft and I will begin Aricept to address symptoms of early dementia.  I will ask for a neuropsychological evaluation and PT and OT will be ordered.  Estimated length of stay in the hospital is 7-10 days.     N/A

## 2021-10-27 NOTE — PROGRESS NOTES
Continued Stay Note  [facility]     Patient Name: Juliano Garzon  MRN: [id number]  Today's Date: 9/21/2017    Admit Date: 9/14/2017          Discharge Plan       09/21/17 0833    Case Management/Social Work Plan    Additional Comments SW received a call from Katie Motley, pt's daughter stating that they are going to get a private sitter so she will be bringing him home when he is discharged.              Discharge Codes     None            GALLO Rm     Pt  came back from [facility] with covid symptoms starting last Friday.  was tested positive this Monday. Patient now has cough, headache, congestion. Assuming it is covid. Pt would like to know what to do to treat? Can she get an order for a covid test and where can she go?         Pt phone [phone]

## 2023-05-19 NOTE — PROGRESS NOTES
Continued Stay Note  [facility]     Patient Name: Juliano Garzon  MRN: [id number]  Today's Date: 9/20/2017    Admit Date: 9/14/2017          Discharge Plan       09/20/17 1543    Case Management/Social Work Plan    Additional Comments SW called & spoke w/pt's sister, Katie Motley to inform her that SW has received 2 calls from the 's office.  Informed her that SW would not be able to speak w/them concerning pt's care w/o a release of information.  Sister, Katie stated that the atty's office was recommended to her by Laurie & they stated that the atty could get him Medicaid in order to have someone sit w/him.  SW informed her that Medicaid would not pay for a sitter but his current issues would cover home health if needed.  Ms. Motley stated that pt already has home health through [facility] due to catheter as well as an Aide who comes twice a week to bathe him.  Ms. Motley stated that she would have to sit w/him until she can get svcs in place.  Ms. Motley stated that the previous person she had to sit w/him is not available at this time.  Ms. Motley informed SW that she would contact her tomorrow w/updates related to securing a sitter.              Discharge Codes     None            GALLO Rm     Never